# Patient Record
Sex: FEMALE | Race: WHITE | Employment: FULL TIME | URBAN - METROPOLITAN AREA
[De-identification: names, ages, dates, MRNs, and addresses within clinical notes are randomized per-mention and may not be internally consistent; named-entity substitution may affect disease eponyms.]

---

## 2017-01-17 RX ORDER — LANSOPRAZOLE 15 MG/1
CAPSULE, DELAYED RELEASE ORAL
Qty: 90 CAPSULE | Refills: 0 | Status: SHIPPED | OUTPATIENT
Start: 2017-01-17 | End: 2017-07-05

## 2017-07-05 ENCOUNTER — HOSPITAL ENCOUNTER (OUTPATIENT)
Dept: OTHER | Age: 62
Discharge: HOME | End: 2017-07-05
Attending: INTERNAL MEDICINE | Admitting: INTERNAL MEDICINE

## 2017-07-05 ENCOUNTER — OFFICE VISIT (OUTPATIENT)
Dept: INTERNAL MEDICINE CLINIC | Age: 62
End: 2017-07-05

## 2017-07-05 VITALS
HEART RATE: 70 BPM | WEIGHT: 171 LBS | RESPIRATION RATE: 18 BRPM | HEIGHT: 65 IN | DIASTOLIC BLOOD PRESSURE: 75 MMHG | SYSTOLIC BLOOD PRESSURE: 130 MMHG | BODY MASS INDEX: 28.49 KG/M2

## 2017-07-05 DIAGNOSIS — M54.9 MECHANICAL BACK PAIN: Primary | ICD-10-CM

## 2017-07-05 DIAGNOSIS — G47.33 OSA (OBSTRUCTIVE SLEEP APNEA): ICD-10-CM

## 2017-07-05 DIAGNOSIS — Z13.220 LIPID SCREENING: ICD-10-CM

## 2017-07-05 DIAGNOSIS — M54.9 MECHANICAL BACK PAIN: ICD-10-CM

## 2017-07-05 LAB
A/G RATIO: 1.9 (ref 1.1–2.2)
ALBUMIN SERPL-MCNC: 4.2 G/DL (ref 3.4–5)
ALP BLD-CCNC: 61 U/L (ref 40–129)
ALT SERPL-CCNC: 11 U/L (ref 10–40)
ANION GAP SERPL CALCULATED.3IONS-SCNC: 13 MMOL/L (ref 3–16)
AST SERPL-CCNC: 12 U/L (ref 15–37)
BASOPHILS ABSOLUTE: 0 K/UL (ref 0–0.2)
BASOPHILS RELATIVE PERCENT: 1 %
BILIRUB SERPL-MCNC: 0.3 MG/DL (ref 0–1)
BUN BLDV-MCNC: 16 MG/DL (ref 7–20)
CALCIUM SERPL-MCNC: 8.7 MG/DL (ref 8.3–10.6)
CHLORIDE BLD-SCNC: 105 MMOL/L (ref 99–110)
CHOLESTEROL, TOTAL: 173 MG/DL (ref 0–199)
CO2: 22 MMOL/L (ref 21–32)
CREAT SERPL-MCNC: 0.6 MG/DL (ref 0.6–1.1)
EOSINOPHILS ABSOLUTE: 0.3 K/UL (ref 0–0.6)
EOSINOPHILS RELATIVE PERCENT: 5.5 %
GFR AFRICAN AMERICAN: >60
GFR NON-AFRICAN AMERICAN: >60
GLOBULIN: 2.2 G/DL
GLUCOSE BLD-MCNC: 102 MG/DL (ref 70–99)
HCT VFR BLD CALC: 41.5 % (ref 36–48)
HDLC SERPL-MCNC: 58 MG/DL (ref 40–60)
HEMOGLOBIN: 14 G/DL (ref 12–16)
LDL CHOLESTEROL CALCULATED: 104 MG/DL
LYMPHOCYTES ABSOLUTE: 1.4 K/UL (ref 1–5.1)
LYMPHOCYTES RELATIVE PERCENT: 28.5 %
MCH RBC QN AUTO: 31.4 PG (ref 26–34)
MCHC RBC AUTO-ENTMCNC: 33.8 G/DL (ref 31–36)
MCV RBC AUTO: 92.8 FL (ref 80–100)
MONOCYTES ABSOLUTE: 0.4 K/UL (ref 0–1.3)
MONOCYTES RELATIVE PERCENT: 9.1 %
NEUTROPHILS ABSOLUTE: 2.7 K/UL (ref 1.7–7.7)
NEUTROPHILS RELATIVE PERCENT: 55.9 %
PDW BLD-RTO: 17.6 % (ref 12.4–15.4)
PLATELET # BLD: 342 K/UL (ref 135–450)
PMV BLD AUTO: 8.4 FL (ref 5–10.5)
POTASSIUM SERPL-SCNC: 4.7 MMOL/L (ref 3.5–5.1)
RBC # BLD: 4.48 M/UL (ref 4–5.2)
SODIUM BLD-SCNC: 140 MMOL/L (ref 136–145)
TOTAL PROTEIN: 6.4 G/DL (ref 6.4–8.2)
TRIGL SERPL-MCNC: 57 MG/DL (ref 0–150)
VLDLC SERPL CALC-MCNC: 11 MG/DL
WBC # BLD: 4.8 K/UL (ref 4–11)

## 2017-07-05 PROCEDURE — 99213 OFFICE O/P EST LOW 20 MIN: CPT | Performed by: INTERNAL MEDICINE

## 2017-07-05 RX ORDER — CYCLOBENZAPRINE HCL 10 MG
7.5 TABLET ORAL 3 TIMES DAILY PRN
Qty: 30 TABLET | Refills: 0 | Status: SHIPPED | OUTPATIENT
Start: 2017-07-05 | End: 2017-07-15

## 2017-07-05 ASSESSMENT — ENCOUNTER SYMPTOMS: BACK PAIN: 1

## 2017-07-06 ENCOUNTER — TELEPHONE (OUTPATIENT)
Dept: INTERNAL MEDICINE CLINIC | Age: 62
End: 2017-07-06

## 2017-07-06 DIAGNOSIS — M47.819 ARTHRITIS OF LOW BACK: Primary | ICD-10-CM

## 2017-09-12 ENCOUNTER — OFFICE VISIT (OUTPATIENT)
Dept: INTERNAL MEDICINE CLINIC | Age: 62
End: 2017-09-12

## 2017-09-12 VITALS
WEIGHT: 166 LBS | SYSTOLIC BLOOD PRESSURE: 140 MMHG | HEART RATE: 70 BPM | BODY MASS INDEX: 27.66 KG/M2 | DIASTOLIC BLOOD PRESSURE: 90 MMHG | HEIGHT: 65 IN | RESPIRATION RATE: 18 BRPM

## 2017-09-12 DIAGNOSIS — J01.40 ACUTE PANSINUSITIS, RECURRENCE NOT SPECIFIED: Primary | ICD-10-CM

## 2017-09-12 PROCEDURE — 99213 OFFICE O/P EST LOW 20 MIN: CPT | Performed by: INTERNAL MEDICINE

## 2017-09-12 RX ORDER — CIPROFLOXACIN 500 MG/1
500 TABLET, FILM COATED ORAL 2 TIMES DAILY
Qty: 14 TABLET | Refills: 0 | Status: SHIPPED | OUTPATIENT
Start: 2017-09-12 | End: 2017-09-19 | Stop reason: SDUPTHER

## 2017-09-12 ASSESSMENT — ENCOUNTER SYMPTOMS
SWOLLEN GLANDS: 1
SORE THROAT: 1
NAUSEA: 0
COUGH: 1

## 2017-09-19 RX ORDER — CIPROFLOXACIN 500 MG/1
TABLET, FILM COATED ORAL
Qty: 14 TABLET | Refills: 0 | Status: SHIPPED | OUTPATIENT
Start: 2017-09-19 | End: 2019-03-18 | Stop reason: ALTCHOICE

## 2017-09-21 ENCOUNTER — OFFICE VISIT (OUTPATIENT)
Dept: ORTHOPEDIC SURGERY | Age: 62
End: 2017-09-21

## 2017-09-21 VITALS
SYSTOLIC BLOOD PRESSURE: 125 MMHG | HEIGHT: 66 IN | BODY MASS INDEX: 26.68 KG/M2 | HEART RATE: 71 BPM | DIASTOLIC BLOOD PRESSURE: 81 MMHG | WEIGHT: 166 LBS

## 2017-09-21 DIAGNOSIS — M19.042 OSTEOARTHRITIS OF FINGER OF LEFT HAND: ICD-10-CM

## 2017-09-21 DIAGNOSIS — R20.0 NUMBNESS AND TINGLING IN LEFT HAND: ICD-10-CM

## 2017-09-21 DIAGNOSIS — R20.2 NUMBNESS AND TINGLING IN LEFT HAND: ICD-10-CM

## 2017-09-21 DIAGNOSIS — M79.642 LEFT HAND PAIN: Primary | ICD-10-CM

## 2017-09-21 DIAGNOSIS — M65.342 TRIGGER RING FINGER OF LEFT HAND: ICD-10-CM

## 2017-09-21 PROCEDURE — 20550 NJX 1 TENDON SHEATH/LIGAMENT: CPT | Performed by: ORTHOPAEDIC SURGERY

## 2017-09-21 PROCEDURE — L3908 WHO COCK-UP NONMOLDE PRE OTS: HCPCS | Performed by: ORTHOPAEDIC SURGERY

## 2017-09-21 PROCEDURE — 73130 X-RAY EXAM OF HAND: CPT | Performed by: ORTHOPAEDIC SURGERY

## 2017-09-21 PROCEDURE — 99203 OFFICE O/P NEW LOW 30 MIN: CPT | Performed by: ORTHOPAEDIC SURGERY

## 2019-03-18 ENCOUNTER — OFFICE VISIT (OUTPATIENT)
Dept: INTERNAL MEDICINE CLINIC | Age: 64
End: 2019-03-18

## 2019-03-18 VITALS
RESPIRATION RATE: 18 BRPM | DIASTOLIC BLOOD PRESSURE: 85 MMHG | HEIGHT: 64 IN | WEIGHT: 185 LBS | HEART RATE: 70 BPM | BODY MASS INDEX: 31.58 KG/M2 | SYSTOLIC BLOOD PRESSURE: 140 MMHG

## 2019-03-18 DIAGNOSIS — Z00.00 ANNUAL PHYSICAL EXAM: Primary | ICD-10-CM

## 2019-03-18 PROBLEM — M65.342 TRIGGER RING FINGER OF LEFT HAND: Status: RESOLVED | Noted: 2017-09-21 | Resolved: 2019-03-18

## 2019-03-18 PROBLEM — R20.0 NUMBNESS AND TINGLING IN LEFT HAND: Status: RESOLVED | Noted: 2017-09-21 | Resolved: 2019-03-18

## 2019-03-18 PROBLEM — R20.2 NUMBNESS AND TINGLING IN LEFT HAND: Status: RESOLVED | Noted: 2017-09-21 | Resolved: 2019-03-18

## 2019-03-18 PROCEDURE — 99396 PREV VISIT EST AGE 40-64: CPT | Performed by: INTERNAL MEDICINE

## 2019-03-18 ASSESSMENT — PATIENT HEALTH QUESTIONNAIRE - PHQ9
SUM OF ALL RESPONSES TO PHQ QUESTIONS 1-9: 0
SUM OF ALL RESPONSES TO PHQ9 QUESTIONS 1 & 2: 0
SUM OF ALL RESPONSES TO PHQ QUESTIONS 1-9: 0
2. FEELING DOWN, DEPRESSED OR HOPELESS: 0
1. LITTLE INTEREST OR PLEASURE IN DOING THINGS: 0

## 2019-03-18 ASSESSMENT — ENCOUNTER SYMPTOMS
TROUBLE SWALLOWING: 0
CONSTIPATION: 0
CHEST TIGHTNESS: 0
SHORTNESS OF BREATH: 0
ABDOMINAL PAIN: 0
COLOR CHANGE: 0
NAUSEA: 0
PHOTOPHOBIA: 0

## 2019-06-05 DIAGNOSIS — Z00.00 ANNUAL PHYSICAL EXAM: ICD-10-CM

## 2019-06-05 LAB
A/G RATIO: 1.4 (ref 1.1–2.2)
ALBUMIN SERPL-MCNC: 4 G/DL (ref 3.4–5)
ALP BLD-CCNC: 59 U/L (ref 40–129)
ALT SERPL-CCNC: 13 U/L (ref 10–40)
ANION GAP SERPL CALCULATED.3IONS-SCNC: 13 MMOL/L (ref 3–16)
AST SERPL-CCNC: 11 U/L (ref 15–37)
BASOPHILS ABSOLUTE: 0 K/UL (ref 0–0.2)
BASOPHILS RELATIVE PERCENT: 1 %
BILIRUB SERPL-MCNC: 0.3 MG/DL (ref 0–1)
BUN BLDV-MCNC: 18 MG/DL (ref 7–20)
CALCIUM SERPL-MCNC: 9.1 MG/DL (ref 8.3–10.6)
CHLORIDE BLD-SCNC: 104 MMOL/L (ref 99–110)
CHOLESTEROL, FASTING: 192 MG/DL (ref 0–199)
CO2: 23 MMOL/L (ref 21–32)
CREAT SERPL-MCNC: 0.8 MG/DL (ref 0.6–1.2)
EOSINOPHILS ABSOLUTE: 0.2 K/UL (ref 0–0.6)
EOSINOPHILS RELATIVE PERCENT: 3.6 %
GFR AFRICAN AMERICAN: >60
GFR NON-AFRICAN AMERICAN: >60
GLOBULIN: 2.9 G/DL
GLUCOSE BLD-MCNC: 110 MG/DL (ref 70–99)
HCT VFR BLD CALC: 45 % (ref 36–48)
HDLC SERPL-MCNC: 53 MG/DL (ref 40–60)
HEMOGLOBIN: 15.2 G/DL (ref 12–16)
HEPATITIS C ANTIBODY INTERPRETATION: NORMAL
LDL CHOLESTEROL CALCULATED: 127 MG/DL
LYMPHOCYTES ABSOLUTE: 1.2 K/UL (ref 1–5.1)
LYMPHOCYTES RELATIVE PERCENT: 27.1 %
MCH RBC QN AUTO: 31.9 PG (ref 26–34)
MCHC RBC AUTO-ENTMCNC: 33.7 G/DL (ref 31–36)
MCV RBC AUTO: 94.6 FL (ref 80–100)
MONOCYTES ABSOLUTE: 0.5 K/UL (ref 0–1.3)
MONOCYTES RELATIVE PERCENT: 11.1 %
NEUTROPHILS ABSOLUTE: 2.6 K/UL (ref 1.7–7.7)
NEUTROPHILS RELATIVE PERCENT: 57.2 %
PDW BLD-RTO: 14.8 % (ref 12.4–15.4)
PLATELET # BLD: 341 K/UL (ref 135–450)
PMV BLD AUTO: 7.9 FL (ref 5–10.5)
POTASSIUM SERPL-SCNC: 5 MMOL/L (ref 3.5–5.1)
RBC # BLD: 4.76 M/UL (ref 4–5.2)
SODIUM BLD-SCNC: 140 MMOL/L (ref 136–145)
TOTAL PROTEIN: 6.9 G/DL (ref 6.4–8.2)
TRIGLYCERIDE, FASTING: 59 MG/DL (ref 0–150)
TSH REFLEX: 3.02 UIU/ML (ref 0.27–4.2)
URIC ACID, SERUM: 5.5 MG/DL (ref 2.6–6)
VLDLC SERPL CALC-MCNC: 12 MG/DL
WBC # BLD: 4.5 K/UL (ref 4–11)

## 2019-06-17 ENCOUNTER — OFFICE VISIT (OUTPATIENT)
Dept: INTERNAL MEDICINE CLINIC | Age: 64
End: 2019-06-17

## 2019-06-17 ENCOUNTER — HOSPITAL ENCOUNTER (OUTPATIENT)
Age: 64
Discharge: HOME | End: 2019-06-17
Payer: COMMERCIAL

## 2019-06-17 VITALS
HEART RATE: 70 BPM | DIASTOLIC BLOOD PRESSURE: 79 MMHG | BODY MASS INDEX: 31.76 KG/M2 | HEIGHT: 64 IN | WEIGHT: 186 LBS | SYSTOLIC BLOOD PRESSURE: 135 MMHG | RESPIRATION RATE: 18 BRPM

## 2019-06-17 DIAGNOSIS — Z01.818 PRE-OP EVALUATION: Primary | ICD-10-CM

## 2019-06-17 DIAGNOSIS — M16.10 HIP ARTHRITIS: ICD-10-CM

## 2019-06-17 LAB
BILIRUBIN, POC: NORMAL
BLOOD URINE, POC: NORMAL
CLARITY, POC: NORMAL
COLOR, POC: NORMAL
GLUCOSE URINE, POC: NORMAL
KETONES, POC: NORMAL
LEUKOCYTE EST, POC: NORMAL
NITRITE, POC: NORMAL
PH, POC: NORMAL
PROTEIN, POC: NORMAL
SPECIFIC GRAVITY, POC: NORMAL
UROBILINOGEN, POC: NORMAL

## 2019-06-17 PROCEDURE — 99241 PR OFFICE CONSULTATION NEW/ESTAB PATIENT 15 MIN: CPT | Performed by: INTERNAL MEDICINE

## 2019-06-17 PROCEDURE — 87641 MR-STAPH DNA AMP PROBE: CPT

## 2019-06-17 PROCEDURE — 93000 ELECTROCARDIOGRAM COMPLETE: CPT | Performed by: INTERNAL MEDICINE

## 2019-06-17 PROCEDURE — 81002 URINALYSIS NONAUTO W/O SCOPE: CPT | Performed by: INTERNAL MEDICINE

## 2019-06-17 RX ORDER — CETIRIZINE HYDROCHLORIDE 10 MG/1
10 TABLET, CHEWABLE ORAL DAILY
COMMUNITY
End: 2019-11-05 | Stop reason: ALTCHOICE

## 2019-06-17 RX ORDER — SPIRONOLACTONE 50 MG/1
100 TABLET, FILM COATED ORAL DAILY
Refills: 1 | COMMUNITY
Start: 2019-05-28

## 2019-06-17 RX ORDER — NAPROXEN SODIUM 220 MG
660 TABLET ORAL 2 TIMES DAILY
COMMUNITY
End: 2019-06-17

## 2019-06-17 NOTE — PROGRESS NOTES
Subjective:      Saritha Siegel is a 66 y.o. female who presents to the office today for a preoperative consultation at the request of surgeon    Dr. Cuong Ramírez  who plans on performing left hip arthoplasty  . Planned anesthesia is Regional and [facility].       No recent illness or cough or sob  No prior allergies to anesthesia  No cardiac or pulm issues    Takes OTC supplements and takes Aldactone for pedal edema      Past Medical History:   Diagnosis Date    Allergic rhinitis     KIERSTEN (obstructive sleep apnea)     PONV (postoperative nausea and vomiting)      Patient Active Problem List    Diagnosis Date Noted    Osteoarthritis of finger of left hand 09/21/2017    Arthritis 01/18/2016    Pedal edema 01/18/2016    KIERSTEN (obstructive sleep apnea) 01/18/2016     Past Surgical History:   Procedure Laterality Date    BREAST RECONSTRUCTION      benign tumors    COLONOSCOPY  10/09/2015    HERNIA REPAIR      KNEE SURGERY      NOSE SURGERY      TONSILLECTOMY       No family history on file. Social History     Socioeconomic History    Marital status: Single     Spouse name: None    Number of children: None    Years of education: None    Highest education level: None   Occupational History    None   Social Needs    Financial resource strain: None    Food insecurity:     Worry: None     Inability: None    Transportation needs:     Medical: None     Non-medical: None   Tobacco Use    Smoking status: Never Smoker    Smokeless tobacco: Never Used   Substance and Sexual Activity    Alcohol use:  Yes     Alcohol/week: 4.2 oz     Types: 7 Cans of beer per week    Drug use: None    Sexual activity: None   Lifestyle    Physical activity:     Days per week: None     Minutes per session: None    Stress: None   Relationships    Social connections:     Talks on phone: None     Gets together: None     Attends Confucianist service: None     Active member of club or organization: None     Attends meetings of clubs or organizations: None     Relationship status: None    Intimate partner violence:     Fear of current or ex partner: None     Emotionally abused: None     Physically abused: None     Forced sexual activity: None   Other Topics Concern    None   Social History Narrative    None     Current Outpatient Medications   Medication Sig Dispense Refill    Cholecalciferol 2000 units TABS Take 6,000 Units by mouth daily      cetirizine (ZYRTEC) 10 MG chewable tablet Take 10 mg by mouth daily      spironolactone (ALDACTONE) 50 MG tablet Take 50 mg by mouth daily  1    fluticasone (FLONASE) 50 MCG/ACT nasal spray 2 sprays by Nasal route daily 1 Bottle 5    cetirizine (ZYRTEC) 5 MG tablet Take 5 mg by mouth daily      vitamin B-12 (CYANOCOBALAMIN) 100 MCG tablet Take 50 mcg by mouth daily      spironolactone (ALDACTONE) 25 MG tablet Take 25 mg by mouth 2 times daily        No current facility-administered medications for this visit. Allergies   Allergen Reactions    Other Anaphylaxis     CLAMS    Adhesive Tape     Iodine     Sulfa Antibiotics     Sulfasalazine        Not in a hospital admission. Review of Systems  Pertinent items are noted in HPI. Planned anesthesia: general . Regional   Known anesthesia problems:  none  Bleeding risk:  Low   Personal or FH of DVT/PE:  No   Patient objection to receiving blood products: no       /79   Pulse 70   Resp 18   Ht 5' 4\" (1.626 m)   Wt 186 lb (84.4 kg)   LMP 01/10/2012   BMI 31.93 kg/m²   No intake or output data in the 24 hours ending 06/17/19 1607     Objective:         General: middle aged female  Awake, alert and oriented.  Appears to be not in any distress  Mucous Membranes:  Pink , anicteric  Neck: No JVD, no carotid bruit, no thyromegaly  Chest:  Clear to auscultation bilaterally, no added sounds  Cardiovascular:  RRR S1S2 heard, no murmurs or gallops  Abdomen:  Soft, undistended, non tender, no organomegaly, BS present  Extremities: No edema

## 2019-06-18 ENCOUNTER — TELEPHONE (OUTPATIENT)
Dept: INTERNAL MEDICINE CLINIC | Age: 64
End: 2019-06-18

## 2019-06-18 LAB — MRSA SCREEN RT-PCR: NORMAL

## 2019-06-19 ENCOUNTER — TELEPHONE (OUTPATIENT)
Dept: INTERNAL MEDICINE CLINIC | Age: 64
End: 2019-06-19

## 2019-06-19 NOTE — TELEPHONE ENCOUNTER
----- Message from Humble Coates MD sent at 6/19/2019  2:54 PM EDT -----    Yes    ----- Message -----  From: Madeline Teague  Sent: 6/19/2019   1:11 PM  To: Humble Coates MD    Culture did not get collected on pt. Does pt need to come back in for this? UA had shown trace leuk. Please advise.

## 2019-06-20 ENCOUNTER — NURSE ONLY (OUTPATIENT)
Dept: INTERNAL MEDICINE CLINIC | Age: 64
End: 2019-06-20

## 2019-06-20 DIAGNOSIS — Z01.818 PREOP EXAMINATION: Primary | ICD-10-CM

## 2019-06-22 LAB — URINE CULTURE, ROUTINE: NORMAL

## 2019-08-15 ENCOUNTER — TELEPHONE (OUTPATIENT)
Dept: INTERNAL MEDICINE CLINIC | Age: 64
End: 2019-08-15

## 2019-08-15 RX ORDER — AMOXICILLIN 500 MG/1
500 CAPSULE ORAL 3 TIMES DAILY
Qty: 15 CAPSULE | Refills: 0 | Status: SHIPPED | OUTPATIENT
Start: 2019-08-15 | End: 2019-08-20

## 2019-08-15 NOTE — TELEPHONE ENCOUNTER
----- Message from Kali Mayers MD sent at 8/15/2019  7:43 AM EDT -----  Contact: pt [phone]  amox 500 tid x 5 days    ----- Message -----  From: Corinne Reich  Sent: 8/14/2019   2:36 PM EDT  To: Kali Mayers MD    Pt called and said that she is getting her teeth cleaned on 9/10/19 and she is requesting for you to prescribe her a days worth of antibiotics for her teeth cleaning. [facility]acy- [facility] app[facility]. 6-17-19.

## 2019-09-10 ENCOUNTER — TELEPHONE (OUTPATIENT)
Dept: INTERNAL MEDICINE CLINIC | Age: 64
End: 2019-09-10

## 2019-09-10 RX ORDER — AMOXICILLIN 500 MG/1
500 CAPSULE ORAL 3 TIMES DAILY
Qty: 15 CAPSULE | Refills: 0 | Status: SHIPPED | OUTPATIENT
Start: 2019-09-10 | End: 2019-09-10

## 2019-09-10 RX ORDER — AMOXICILLIN 500 MG/1
500 CAPSULE ORAL 3 TIMES DAILY
Qty: 15 CAPSULE | Refills: 0 | Status: SHIPPED | OUTPATIENT
Start: 2019-09-10 | End: 2019-09-15

## 2019-11-05 ENCOUNTER — OFFICE VISIT (OUTPATIENT)
Dept: INTERNAL MEDICINE CLINIC | Age: 64
End: 2019-11-05

## 2019-11-05 ENCOUNTER — HOSPITAL ENCOUNTER (OUTPATIENT)
Age: 64
Discharge: HOME | End: 2019-11-05
Payer: COMMERCIAL

## 2019-11-05 VITALS
BODY MASS INDEX: 30.99 KG/M2 | RESPIRATION RATE: 18 BRPM | HEIGHT: 65 IN | HEART RATE: 70 BPM | SYSTOLIC BLOOD PRESSURE: 125 MMHG | WEIGHT: 186 LBS | DIASTOLIC BLOOD PRESSURE: 75 MMHG

## 2019-11-05 DIAGNOSIS — Z01.818 PRE-OP EVALUATION: ICD-10-CM

## 2019-11-05 DIAGNOSIS — M19.90 ARTHRITIS: ICD-10-CM

## 2019-11-05 DIAGNOSIS — Z23 NEED FOR INFLUENZA VACCINATION: ICD-10-CM

## 2019-11-05 DIAGNOSIS — M16.10 HIP ARTHRITIS: ICD-10-CM

## 2019-11-05 DIAGNOSIS — R60.0 PEDAL EDEMA: ICD-10-CM

## 2019-11-05 DIAGNOSIS — Z01.818 PRE-OP EVALUATION: Primary | ICD-10-CM

## 2019-11-05 PROBLEM — M16.12 ARTHRITIS OF LEFT HIP: Status: ACTIVE | Noted: 2019-06-24

## 2019-11-05 LAB
INR BLD: 1.07 (ref 0.86–1.14)
PROTHROMBIN TIME: 12.2 SEC (ref 9.8–13)

## 2019-11-05 PROCEDURE — 90682 RIV4 VACC RECOMBINANT DNA IM: CPT | Performed by: INTERNAL MEDICINE

## 2019-11-05 PROCEDURE — 90732 PPSV23 VACC 2 YRS+ SUBQ/IM: CPT | Performed by: INTERNAL MEDICINE

## 2019-11-05 PROCEDURE — 83036 HEMOGLOBIN GLYCOSYLATED A1C: CPT

## 2019-11-05 PROCEDURE — 90472 IMMUNIZATION ADMIN EACH ADD: CPT | Performed by: INTERNAL MEDICINE

## 2019-11-05 PROCEDURE — 85610 PROTHROMBIN TIME: CPT

## 2019-11-05 PROCEDURE — 99241 PR OFFICE CONSULTATION NEW/ESTAB PATIENT 15 MIN: CPT | Performed by: INTERNAL MEDICINE

## 2019-11-05 PROCEDURE — 36415 COLL VENOUS BLD VENIPUNCTURE: CPT

## 2019-11-05 PROCEDURE — 90471 IMMUNIZATION ADMIN: CPT | Performed by: INTERNAL MEDICINE

## 2019-11-05 PROCEDURE — 87081 CULTURE SCREEN ONLY: CPT

## 2019-11-06 LAB
ESTIMATED AVERAGE GLUCOSE: 125.5 MG/DL
HBA1C MFR BLD: 6 %

## 2019-11-07 ENCOUNTER — TELEPHONE (OUTPATIENT)
Dept: INTERNAL MEDICINE CLINIC | Age: 64
End: 2019-11-07

## 2019-11-08 ENCOUNTER — NURSE ONLY (OUTPATIENT)
Dept: INTERNAL MEDICINE CLINIC | Age: 64
End: 2019-11-08

## 2019-11-08 DIAGNOSIS — Z00.00 ROUTINE GENERAL MEDICAL EXAMINATION AT A HEALTH CARE FACILITY: Primary | ICD-10-CM

## 2019-11-08 LAB
BILIRUBIN, POC: NORMAL
BLOOD URINE, POC: NORMAL
CLARITY, POC: NORMAL
COLOR, POC: NORMAL
GLUCOSE URINE, POC: NORMAL
KETONES, POC: NORMAL
LEUKOCYTE EST, POC: NORMAL
MRSA CULTURE ONLY: NORMAL
NITRITE, POC: NORMAL
PH, POC: NORMAL
PROTEIN, POC: NORMAL
SPECIFIC GRAVITY, POC: NORMAL
UROBILINOGEN, POC: NORMAL

## 2019-11-08 PROCEDURE — 81002 URINALYSIS NONAUTO W/O SCOPE: CPT | Performed by: INTERNAL MEDICINE

## 2019-11-19 ENCOUNTER — TELEPHONE (OUTPATIENT)
Dept: INTERNAL MEDICINE CLINIC | Age: 64
End: 2019-11-19

## 2020-01-15 ENCOUNTER — TELEPHONE (OUTPATIENT)
Dept: INTERNAL MEDICINE CLINIC | Age: 65
End: 2020-01-15

## 2020-01-15 NOTE — TELEPHONE ENCOUNTER
----- Message from Juanito Phoenix sent at 1/15/2020 10:33 AM EST -----  Contact: [phone]  Pt was just calling to make sure we had the results from Dr. Ekaterina Haq for her mammogram that was done on 1/3 also her PAP smear from Dr. Mayte Telles.      [phone]

## 2020-02-28 RX ORDER — AMOXICILLIN 500 MG/1
500 CAPSULE ORAL 3 TIMES DAILY
Qty: 15 CAPSULE | Refills: 0 | Status: SHIPPED | OUTPATIENT
Start: 2020-02-28 | End: 2020-03-04

## 2020-10-01 ENCOUNTER — TELEPHONE (OUTPATIENT)
Dept: INTERNAL MEDICINE CLINIC | Age: 65
End: 2020-10-01

## 2020-10-01 NOTE — TELEPHONE ENCOUNTER
----- Message from Mickie Connelly MD sent at 10/1/2020  9:57 AM EDT -----  Contact: pt- [phone]  After the COVID test call in Amoxil 500 mg po tid # 30  ----- Message -----  From: Marsha Ramosmarthadaniela Casey  Sent: 10/1/2020   9:41 AM EDT  To: MD Dr Roger Knowles pt- she has been sick for about 3 days now- sinus drainage and cough from that-she has sinus pain in her cheeks and the roof of her mouth and teeth hurt also- no fever and no other symptoms- she thinks it is a sinus infection -she has been using dayquil but not helping - wanted to know what you recommend- [facility] at [phone]-[phone] appt- 11-5-19-lr

## 2020-10-02 ENCOUNTER — OFFICE VISIT (OUTPATIENT)
Dept: PRIMARY CARE CLINIC | Age: 65
End: 2020-10-02
Payer: COMMERCIAL

## 2020-10-02 PROCEDURE — 99211 OFF/OP EST MAY X REQ PHY/QHP: CPT | Performed by: NURSE PRACTITIONER

## 2020-10-02 NOTE — PROGRESS NOTES
Wen Gan received a viral test for COVID-19. They were educated on isolation and quarantine as appropriate. For any symptoms, they were directed to seek care from their PCP, given contact information to establish with a doctor, directed to an urgent care or the emergency room.

## 2020-10-02 NOTE — PATIENT INSTRUCTIONS

## 2020-10-03 LAB — SARS-COV-2, NAA: NOT DETECTED

## 2020-10-13 ENCOUNTER — TELEPHONE (OUTPATIENT)
Dept: INTERNAL MEDICINE CLINIC | Age: 65
End: 2020-10-13

## 2020-10-13 ENCOUNTER — HOSPITAL ENCOUNTER (EMERGENCY)
Age: 65
Discharge: HOME | End: 2020-10-13
Payer: COMMERCIAL

## 2020-10-13 VITALS
WEIGHT: 185 LBS | BODY MASS INDEX: 30.82 KG/M2 | TEMPERATURE: 99.1 F | SYSTOLIC BLOOD PRESSURE: 130 MMHG | OXYGEN SATURATION: 100 % | HEART RATE: 78 BPM | RESPIRATION RATE: 17 BRPM | DIASTOLIC BLOOD PRESSURE: 61 MMHG | HEIGHT: 65 IN

## 2020-10-13 LAB
A/G RATIO: 1.6 (ref 1.1–2.2)
ALBUMIN SERPL-MCNC: 4.4 G/DL (ref 3.4–5)
ALP BLD-CCNC: 81 U/L (ref 40–129)
ALT SERPL-CCNC: 12 U/L (ref 10–40)
ANION GAP SERPL CALCULATED.3IONS-SCNC: 11 MMOL/L (ref 3–16)
AST SERPL-CCNC: 16 U/L (ref 15–37)
BASOPHILS ABSOLUTE: 0 K/UL (ref 0–0.2)
BASOPHILS RELATIVE PERCENT: 0.2 %
BILIRUB SERPL-MCNC: 0.8 MG/DL (ref 0–1)
BUN BLDV-MCNC: 9 MG/DL (ref 7–20)
CALCIUM SERPL-MCNC: 9.4 MG/DL (ref 8.3–10.6)
CHLORIDE BLD-SCNC: 99 MMOL/L (ref 99–110)
CO2: 25 MMOL/L (ref 21–32)
CREAT SERPL-MCNC: 0.7 MG/DL (ref 0.6–1.2)
EOSINOPHILS ABSOLUTE: 0.4 K/UL (ref 0–0.6)
EOSINOPHILS RELATIVE PERCENT: 3.7 %
GFR AFRICAN AMERICAN: >60
GFR NON-AFRICAN AMERICAN: >60
GLOBULIN: 2.7 G/DL
GLUCOSE BLD-MCNC: 102 MG/DL (ref 70–99)
HCT VFR BLD CALC: 51.2 % (ref 36–48)
HEMOGLOBIN: 17 G/DL (ref 12–16)
LYMPHOCYTES ABSOLUTE: 0.9 K/UL (ref 1–5.1)
LYMPHOCYTES RELATIVE PERCENT: 7.9 %
MCH RBC QN AUTO: 31.7 PG (ref 26–34)
MCHC RBC AUTO-ENTMCNC: 33.3 G/DL (ref 31–36)
MCV RBC AUTO: 95.3 FL (ref 80–100)
MONOCYTES ABSOLUTE: 0.5 K/UL (ref 0–1.3)
MONOCYTES RELATIVE PERCENT: 4.5 %
NEUTROPHILS ABSOLUTE: 9.2 K/UL (ref 1.7–7.7)
NEUTROPHILS RELATIVE PERCENT: 83.7 %
PDW BLD-RTO: 15.5 % (ref 12.4–15.4)
PLATELET # BLD: 347 K/UL (ref 135–450)
PMV BLD AUTO: 7.8 FL (ref 5–10.5)
POTASSIUM REFLEX MAGNESIUM: 4.4 MMOL/L (ref 3.5–5.1)
RBC # BLD: 5.37 M/UL (ref 4–5.2)
SODIUM BLD-SCNC: 135 MMOL/L (ref 136–145)
TOTAL PROTEIN: 7.1 G/DL (ref 6.4–8.2)
WBC # BLD: 11 K/UL (ref 4–11)

## 2020-10-13 PROCEDURE — 96374 THER/PROPH/DIAG INJ IV PUSH: CPT

## 2020-10-13 PROCEDURE — 2500000003 HC RX 250 WO HCPCS: Performed by: PHYSICIAN ASSISTANT

## 2020-10-13 PROCEDURE — 85025 COMPLETE CBC W/AUTO DIFF WBC: CPT

## 2020-10-13 PROCEDURE — 96376 TX/PRO/DX INJ SAME DRUG ADON: CPT

## 2020-10-13 PROCEDURE — 99283 EMERGENCY DEPT VISIT LOW MDM: CPT

## 2020-10-13 PROCEDURE — 80053 COMPREHEN METABOLIC PANEL: CPT

## 2020-10-13 PROCEDURE — 2580000003 HC RX 258: Performed by: PHYSICIAN ASSISTANT

## 2020-10-13 PROCEDURE — 96375 TX/PRO/DX INJ NEW DRUG ADDON: CPT

## 2020-10-13 PROCEDURE — 6360000002 HC RX W HCPCS: Performed by: PHYSICIAN ASSISTANT

## 2020-10-13 RX ORDER — DIPHENHYDRAMINE HYDROCHLORIDE 50 MG/ML
25 INJECTION INTRAMUSCULAR; INTRAVENOUS ONCE
Status: COMPLETED | OUTPATIENT
Start: 2020-10-13 | End: 2020-10-13

## 2020-10-13 RX ORDER — 0.9 % SODIUM CHLORIDE 0.9 %
1000 INTRAVENOUS SOLUTION INTRAVENOUS ONCE
Status: COMPLETED | OUTPATIENT
Start: 2020-10-13 | End: 2020-10-13

## 2020-10-13 RX ORDER — GABAPENTIN 100 MG/1
100 CAPSULE ORAL 3 TIMES DAILY
COMMUNITY
End: 2021-06-30 | Stop reason: ALTCHOICE

## 2020-10-13 RX ORDER — MAGNESIUM 30 MG
400 TABLET ORAL NIGHTLY
COMMUNITY

## 2020-10-13 RX ORDER — PREDNISONE 10 MG/1
60 TABLET ORAL DAILY
Qty: 30 TABLET | Refills: 0 | Status: SHIPPED | OUTPATIENT
Start: 2020-10-13 | End: 2020-10-18

## 2020-10-13 RX ORDER — FAMOTIDINE 20 MG/1
20 TABLET, FILM COATED ORAL 2 TIMES DAILY
Qty: 60 TABLET | Refills: 0 | Status: SHIPPED | OUTPATIENT
Start: 2020-10-13 | End: 2022-07-06 | Stop reason: ALTCHOICE

## 2020-10-13 RX ORDER — DIPHENHYDRAMINE HYDROCHLORIDE 50 MG/ML
12.5 INJECTION INTRAMUSCULAR; INTRAVENOUS ONCE
Status: COMPLETED | OUTPATIENT
Start: 2020-10-13 | End: 2020-10-13

## 2020-10-13 RX ORDER — DEXAMETHASONE SODIUM PHOSPHATE 10 MG/ML
10 INJECTION, SOLUTION INTRAMUSCULAR; INTRAVENOUS ONCE
Status: COMPLETED | OUTPATIENT
Start: 2020-10-13 | End: 2020-10-13

## 2020-10-13 RX ADMIN — DIPHENHYDRAMINE HYDROCHLORIDE 12.5 MG: 50 INJECTION, SOLUTION INTRAMUSCULAR; INTRAVENOUS at 12:23

## 2020-10-13 RX ADMIN — FAMOTIDINE 20 MG: 10 INJECTION INTRAVENOUS at 10:48

## 2020-10-13 RX ADMIN — DEXAMETHASONE SODIUM PHOSPHATE 10 MG: 10 INJECTION INTRAMUSCULAR; INTRAVENOUS at 10:48

## 2020-10-13 RX ADMIN — SODIUM CHLORIDE 1000 ML: 9 INJECTION, SOLUTION INTRAVENOUS at 10:47

## 2020-10-13 RX ADMIN — DIPHENHYDRAMINE HYDROCHLORIDE 25 MG: 50 INJECTION, SOLUTION INTRAMUSCULAR; INTRAVENOUS at 10:47

## 2020-10-13 ASSESSMENT — ENCOUNTER SYMPTOMS
RESPIRATORY NEGATIVE: 1
GASTROINTESTINAL NEGATIVE: 1

## 2020-10-13 NOTE — TELEPHONE ENCOUNTER
----- Message from Meagan Huggins MD sent at 10/13/2020  8:58 AM EDT -----  Stop clinda   Update allergy list  Add augmentin 875 mg bid x 5 days  ----- Message -----  From: Lanny Chan  Sent: 10/13/2020   8:10 AM EDT  To: Meagan Huggins MD    She is covered in hives and thinks it may be from the clindamycin she was given. She is going to ER to take care of this problem.   Also wanted to let you know she is having a root canal Oct. 26 and will need an antibiotic 2 days before and 2 days after  Grady Sharp

## 2020-10-13 NOTE — ED PROVIDER NOTES
[address] [facility]  EMERGENCY DEPARTMENT ENCOUNTER        Pt Name: Tonya Eli  MRN: [id number]  [place] 1955  Date of evaluation: 10/13/2020  Provider: Ольга Phoenix PA-C  PCP: Mabel Felty, MD    DOMENICO. I have evaluated this patient. My supervising physician was available for consultation. CHIEF COMPLAINT       Chief Complaint   Patient presents with    Rash     Excessive rash over entire body. Took Clindamycin a week ago and developed rash yesterday. Denies SOB or throat itching. Took benadryl with no relief. HISTORY OF PRESENT ILLNESS   (Location, Timing/Onset, Context/Setting, Quality, Duration, Modifying Factors, Severity, Associated Signs and Symptoms)  Note limiting factors. Tonya Eli is a 60 y.o. female with a past medical history of sleep apnea brought in today for evaluation of a \"rash\" covering her entire body. She states she started taking clindamycin 1 week ago for a sinus infection. She developed the hive-like rash 2 days ago. She admits to pruritus. She took Benadryl yesterday with no relief of symptoms. Onset of the rash started 2 days ago. Duration of symptoms have been persistent since onset. Context includes after taking clindamycin for a sinus infection 1 week prior. No aggravating or alleviating complaints. Denies any chest pain shortness of breath difficulty breathing or trouble swallowing. She denies any known allergy to clindamycin. She does have an allergy to sulfa drugs and does get hives with sulfa drugs. Denies any fevers or chills. She otherwise denies any other complaints. Nothing seems to make the rash better or worse at this time. Nursing Notes were all reviewed and agreed with or any disagreements were addressed in the HPI. REVIEW OF SYSTEMS    (2-9 systems for level 4, 10 or more for level 5)     Review of Systems   Constitutional: Negative. Respiratory: Negative. Cardiovascular: Negative. Gastrointestinal: Negative. Genitourinary: Negative. Musculoskeletal: Negative. Skin: Positive for rash. Neurological: Negative. Positives and Pertinent negatives as per HPI. Except as noted above in the ROS, all other systems were reviewed and negative. PAST MEDICAL HISTORY     Past Medical History:   Diagnosis Date    Allergic rhinitis     KIERSTEN (obstructive sleep apnea)     PONV (postoperative nausea and vomiting)          SURGICAL HISTORY     Past Surgical History:   Procedure Laterality Date    BREAST RECONSTRUCTION      benign tumors    COLONOSCOPY  10/09/2015    HERNIA REPAIR      JOINT REPLACEMENT      left - 6/2019, right 12/2019    KNEE SURGERY      NOSE SURGERY      TONSILLECTOMY           CURRENTMEDICATIONS       Discharge Medication List as of 10/13/2020  1:19 PM      CONTINUE these medications which have NOT CHANGED    Details   magnesium 30 MG tablet Take 400 mg by mouth nightly Historical Med      gabapentin (NEURONTIN) 100 MG capsule Take 100 mg by mouth 3 times daily. Historical Med      Cholecalciferol 2000 units TABS Take 6,000 Units by mouth dailyHistorical Med      spironolactone (ALDACTONE) 50 MG tablet Take 100 mg by mouth daily , R-1Historical Med      cetirizine (ZYRTEC) 5 MG tablet Take 5 mg by mouth daily      fluticasone (FLONASE) 50 MCG/ACT nasal spray 2 sprays by Nasal route daily, Disp-1 Bottle, R-5      vitamin B-12 (CYANOCOBALAMIN) 100 MCG tablet Take 50 mcg by mouth daily               ALLERGIES     Other; Adhesive tape; Iodine; Penicillins; Sulfa antibiotics; Sulfasalazine; and Clindamycin/lincomycin    FAMILYHISTORY     History reviewed. No pertinent family history. SOCIAL HISTORY       Social History     Tobacco Use    Smoking status: Never Smoker    Smokeless tobacco: Never Used   Substance Use Topics    Alcohol use:  Yes     Alcohol/week: 1.0 standard drinks     Types: 1 Glasses of wine per week     Comment: nightly    Drug use: Not on file       SCREENINGS    Fabian Coma Scale  Eye Opening: Spontaneous  Best Verbal Response: Oriented  Best Motor Response: Obeys commands  Fabian Coma Scale Score: 15        PHYSICAL EXAM    (up to 7 for level 4, 8 or more for level 5)     ED Triage Vitals [10/13/20 0920]   BP Temp Temp Source Pulse Resp SpO2 Height Weight   (!) 150/80 99.1 °F (37.3 °C) Tympanic 102 15 100 % 5' 5\" (1.651 m) 185 lb (83.9 kg)       Physical Exam  Vitals signs and nursing note reviewed. Constitutional:       General: She is awake. She is not in acute distress. Appearance: Normal appearance. She is well-developed and overweight. She is not ill-appearing, toxic-appearing or diaphoretic. HENT:      Head: Normocephalic and atraumatic. Nose: Nose normal.      Mouth/Throat:      Lips: Pink. Mouth: No injury, lacerations, oral lesions or angioedema. Tongue: No lesions. Palate: No lesions. Pharynx: Oropharynx is clear. Uvula midline. No pharyngeal swelling, oropharyngeal exudate, posterior oropharyngeal erythema or uvula swelling. Tonsils: No tonsillar exudate or tonsillar abscesses. 0 on the right. 0 on the left. Comments: No swelling noted to the tongue or the floor of the mouth. Oral mucosa is pink and moist.  No tripod positioning. No drooling. Eyes:      General:         Right eye: No discharge. Left eye: No discharge. Neck:      Musculoskeletal: Normal range of motion and neck supple. Cardiovascular:      Rate and Rhythm: Normal rate and regular rhythm. Pulses:           Radial pulses are 2+ on the right side and 2+ on the left side. Heart sounds: Normal heart sounds. No murmur. No gallop. Pulmonary:      Effort: Pulmonary effort is normal. No respiratory distress. Breath sounds: Normal breath sounds. No wheezing or rales. Chest:      Chest wall: No tenderness. Musculoskeletal: Normal range of motion. General: No deformity.    Skin:     General: Skin is warm and dry. Findings: Rash present. Rash is urticarial.      Comments: See picture below. Rash blanches freely. Neurological:      General: No focal deficit present. Mental Status: She is alert and oriented to person, place, and time. GCS: GCS eye subscore is 4. GCS verbal subscore is 5. GCS motor subscore is 6. Psychiatric:         Behavior: Behavior normal. Behavior is cooperative. DIAGNOSTIC RESULTS   LABS:    Labs Reviewed   CBC WITH AUTO DIFFERENTIAL - Abnormal; Notable for the following components:       Result Value    RBC 5.37 (*)     Hemoglobin 17.0 (*)     Hematocrit 51.2 (*)     RDW 15.5 (*)     Neutrophils Absolute 9.2 (*)     Lymphocytes Absolute 0.9 (*)     All other components within normal limits    Narrative:     Performed at:  [facility] [address],  [place], [facility]   Phone [phone]   COMPREHENSIVE METABOLIC PANEL W/ REFLEX TO MG FOR LOW K - Abnormal; Notable for the following components:    Sodium 135 (*)     Glucose 102 (*)     All other components within normal limits    Narrative:     Performed at:  [facility]) - [facility] [address],  [place], [facility]   Phone [phone]       All other labs were within normal range or not returned as of this dictation. EKG: All EKG's are interpreted by the Emergency Department Physician in the absence of a cardiologist.  Please see their note for interpretation of EKG. RADIOLOGY:   Non-plain film images such as CT, Ultrasound and MRI are read by the radiologist. Plain radiographic images are visualized and preliminarily interpreted by the ED Provider with the below findings:        Interpretation per the Radiologist below, if available at the time of this note:    No orders to display     No results found.         PROCEDURES   Unless otherwise noted below, none     Procedures    CRITICAL CARE TIME N/A    CONSULTS:  None      EMERGENCY DEPARTMENT COURSE and DIFFERENTIAL DIAGNOSIS/MDM:   Vitals:    Vitals:    10/13/20 0920 10/13/20 1101 10/13/20 1354   BP: (!) 150/80 136/70 130/61   Pulse: 102 79 78   Resp: 15 17 17   Temp: 99.1 °F (37.3 °C)     TempSrc: Tympanic     SpO2: 100% 100% 100%   Weight: 185 lb (83.9 kg)     Height: 5' 5\" (1.651 m)         Patient was given the following medications:  Medications   0.9 % sodium chloride bolus (0 mLs Intravenous Stopped 10/13/20 1355)   diphenhydrAMINE (BENADRYL) injection 25 mg (25 mg Intravenous Given 10/13/20 1047)   dexamethasone (PF) (DECADRON) injection 10 mg (10 mg Intravenous Given 10/13/20 1048)   famotidine (PEPCID) injection 20 mg (20 mg Intravenous Given 10/13/20 1048)   diphenhydrAMINE (BENADRYL) injection 12.5 mg (12.5 mg Intravenous Given 10/13/20 1223)           Patient brought in today by private vehicle for complaints of a pruritic rash that started 2 days prior. On exam she is alert oriented afebrile breathing on room air satting at 100%. Nontoxic in appearance no acute respiratory distress. Old labs and records reviewed. Patient seen by myself and my attending was available for consultation. CBC reveals no acute leukocytosis. Hemoglobin is 17. No acute electrolyte abnormalities. Kidney function unremarkable. Patient received fluids, Pepcid Benadryl and Decadron while here in the ER. Patient also received another dose of Benadryl while here in the ER. Patient was observed here in the ER and was hemodynamically stable. Her vitals were stable this entire time. Patient denied any difficulty swallowing or trouble breathing. Plan at this time will be to discharge with Pepcid as well as prednisone. Patient reports she does have Benadryl at home. Patient told to continue with Pepcid, Benadryl and prednisone at home. Patient also encouraged to continue with oatmeal baths at home.   She was told to follow-up with her family physician

## 2020-10-13 NOTE — TELEPHONE ENCOUNTER
----- Message from Earle Rea MD sent at 10/13/2020  3:42 PM EDT -----  [id number] [address] Abbasi  ----- Message -----  From: Dg Peter  Sent: 10/13/2020   3:09 PM EDT  To: Earle Rea MD    Pt will be finished with ER abx eight days before root canal.  Pt wanting to make sure this is ok?  ----- Message -----  From: Earle Rea MD  Sent: 10/13/2020   2:35 PM EDT  To: Dg Peter    Yes  ----- Message -----  From: Dg Peter  Sent: 10/13/2020   1:54 PM EDT  To: Earle Rea MD    So the abx from ER is enough for her root canal also?  ----- Message -----  From: Earle Rea MD  Sent: 10/13/2020   1:02 PM EDT  To: Dg Peter    So no more need for augmentin  ----- Message -----  From: Dg Peter  Sent: 10/13/2020   9:54 AM EDT  To: Earle Rea MD    Allergy to pcn and sulfa drugs. Please advise. Pt is in ER and they are also prescribing abx for her current issue.  ----- Message -----  From: Earle Rea MD  Sent: 10/13/2020   8:58 AM EDT  To: Abhijeet Gonsalez    Stop clinda   Update allergy list  Add augmentin 875 mg bid x 5 days  ----- Message -----  From: Donald Floyd  Sent: 10/13/2020   8:10 AM EDT  To: Earle Rea MD    She is covered in hives and thinks it may be from the clindamycin she was given. She is going to ER to take care of this problem.   Also wanted to let you know she is having a root canal Oct. 26 and will need an antibiotic 2 days before and 2 days after  Jack Babb

## 2020-10-14 ENCOUNTER — TELEPHONE (OUTPATIENT)
Dept: INTERNAL MEDICINE CLINIC | Age: 65
End: 2020-10-14

## 2020-10-14 ENCOUNTER — CARE COORDINATION (OUTPATIENT)
Dept: CARE COORDINATION | Age: 65
End: 2020-10-14

## 2020-10-14 RX ORDER — HYDROXYZINE 50 MG/1
50 TABLET, FILM COATED ORAL 3 TIMES DAILY PRN
Qty: 30 TABLET | Refills: 0 | Status: SHIPPED | OUTPATIENT
Start: 2020-10-14 | End: 2020-10-24

## 2020-10-14 NOTE — TELEPHONE ENCOUNTER
Per Dr Alcira Cardozo since patient was prescribed prednisone, he is prescribing her atarax 50 mg tid #30. Patient informed of this by Ryne Lerma.   She also informed patient to not take benadryl with the atarax

## 2020-10-14 NOTE — TELEPHONE ENCOUNTER
----- Message from Twila Rice sent at 10/14/2020  4:05 PM EDT -----  Medrol dose pack per DR. RUBY  ----- Message -----  From: Twila Dwayne  Sent: 10/14/2020   3:35 PM EDT  To: aHllie Thapa MD    Pt states she will have to use a whole tube each time she applies it because she is covered.   Wants to know if there is something else she should do?  ----- Message -----  From: Hallie Thapa MD  Sent: 10/14/2020   3:31 PM EDT  To: Twila Rice    Have her get OTC Benadryl itch cream  ----- Message -----  From: Dante Hidalgo  Sent: 10/14/2020   2:47 PM EDT  To: MD Fior Simons told her to call to get a anti itch medicine called in, pictures are in her chart she had allergic reaction to medication zachary vidales  she has appointment 10/19 with [facility]

## 2020-10-14 NOTE — CARE COORDINATION
ACM received a voicemail and text message regarding her rash. It has not improved. ACM reached out to office to update them to her status. She was offered a follow up appt for tomorrow that would not work with her schedule. Dr. Luna Garcia ordered atarax 50 mg tid. I have instructed patient on the use and specified that she is not to use the benadryl and atarax at the same time. Patient verbalized instructions back to me.
people if COVID-19 is spreading in your community.  Clean and disinfect frequently touched surfaces.  Avoid all cruise travel and non-essential air travel.  Call your healthcare professional if you have concerns about COVID-19 and your underlying condition or if you are sick. For more information on steps you can take to protect yourself, see CDC's How to [place] for follow-up call in 1-2 days based on severity of symptoms and risk factors.   Message to provider related to ongoing symptoms

## 2020-10-15 ENCOUNTER — CARE COORDINATION (OUTPATIENT)
Dept: CARE COORDINATION | Age: 65
End: 2020-10-15

## 2020-10-15 NOTE — CARE COORDINATION
ACM received return call from patient. Reported her itching is much better with use of the atarax. She has a follow up at PCP office on Monday.

## 2020-10-26 ENCOUNTER — OFFICE VISIT (OUTPATIENT)
Dept: INTERNAL MEDICINE CLINIC | Age: 65
End: 2020-10-26

## 2020-10-26 VITALS
HEIGHT: 65 IN | HEART RATE: 78 BPM | DIASTOLIC BLOOD PRESSURE: 80 MMHG | WEIGHT: 185 LBS | RESPIRATION RATE: 12 BRPM | BODY MASS INDEX: 30.82 KG/M2 | SYSTOLIC BLOOD PRESSURE: 120 MMHG | TEMPERATURE: 97.1 F

## 2020-10-26 PROCEDURE — 1111F DSCHRG MED/CURRENT MED MERGE: CPT | Performed by: PHYSICIAN ASSISTANT

## 2020-10-26 PROCEDURE — 99213 OFFICE O/P EST LOW 20 MIN: CPT | Performed by: PHYSICIAN ASSISTANT

## 2020-10-26 RX ORDER — AMOXICILLIN 500 MG/1
500 CAPSULE ORAL 2 TIMES DAILY
Qty: 14 CAPSULE | Refills: 0 | Status: SHIPPED | OUTPATIENT
Start: 2020-10-26 | End: 2020-11-02

## 2020-10-26 ASSESSMENT — ENCOUNTER SYMPTOMS
SORE THROAT: 1
COUGH: 0
RHINORRHEA: 1
SINUS PAIN: 1
SINUS PRESSURE: 1

## 2020-10-26 NOTE — PROGRESS NOTES
Chief Complaint:   Jonathan Lala is a 63 y.o. female who presents for   Chief Complaint   Patient presents with    Follow-Up from Hospital    Sinusitis       HPI:     Presents for ER follow up. Pt was seen in ED on 10/13 for an allergic reaction. Pt had developed a sinus infection in early October. Sent for COVID test which was negative. She continued to have sinus discomfort and pt went to dentist where she had xrays that noted a sinus infection. Pt was started on Clindamycin. About 2 days later, pt developed a diffuse painful, itchy rash. She went to the ED. She was given Pepcid, Decadron and Benadryl. She was sent home with Benadryl and Prednisone and told to discontinue Clindamycin. She reports in about a weeks time, the rash began to fade and pt felt much improvement. She does report still having sinus pain and tenderness. Pt states she took 7 days of Clindamycin. Review of Systems  Review of Systems   Constitutional: Negative for chills and fever. HENT: Positive for postnasal drip, rhinorrhea, sinus pressure, sinus pain and sore throat. Respiratory: Negative for cough. Cardiovascular: Negative for chest pain. Allergies  Other; Adhesive tape; Iodine; Penicillins; Sulfa antibiotics; Sulfasalazine; and Clindamycin/lincomycin      Vitals  /80   Pulse 78   Temp 97.1 °F (36.2 °C)   Resp 12   Ht 5' 5\" (1.651 m)   Wt 185 lb (83.9 kg)   LMP 01/10/2012   BMI 30.79 kg/m²     Current Medications  Current Outpatient Medications   Medication Sig Dispense Refill    amoxicillin (AMOXIL) 500 MG capsule Take 1 capsule by mouth 2 times daily for 7 days 14 capsule 0    magnesium 30 MG tablet Take 400 mg by mouth nightly       gabapentin (NEURONTIN) 100 MG capsule Take 100 mg by mouth 3 times daily.       famotidine (PEPCID) 20 MG tablet Take 1 tablet by mouth 2 times daily 60 tablet 0    Cholecalciferol 2000 units TABS Take 6,000 Units by mouth daily      spironolactone (ALDACTONE) 50 MG tablet Take 100 mg by mouth daily   1    fluticasone (FLONASE) 50 MCG/ACT nasal spray 2 sprays by Nasal route daily 1 Bottle 5    cetirizine (ZYRTEC) 5 MG tablet Take 5 mg by mouth daily      vitamin B-12 (CYANOCOBALAMIN) 100 MCG tablet Take 50 mcg by mouth daily       No current facility-administered medications for this visit. Past Medical History  Past Medical History:   Diagnosis Date    Allergic rhinitis     KIERSTEN (obstructive sleep apnea)     PONV (postoperative nausea and vomiting)        Social History  Social History     Socioeconomic History    Marital status: Single     Spouse name: Not on file    Number of children: Not on file    Years of education: Not on file    Highest education level: Not on file   Occupational History    Not on file   Social Needs    Financial resource strain: Not on file    Food insecurity     Worry: Not on file     Inability: Not on file    Transportation needs     Medical: Not on file     Non-medical: Not on file   Tobacco Use    Smoking status: Never Smoker    Smokeless tobacco: Never Used   Substance and Sexual Activity    Alcohol use:  Yes     Alcohol/week: 1.0 standard drinks     Types: 1 Glasses of wine per week     Comment: nightly    Drug use: Not on file    Sexual activity: Not on file   Lifestyle    Physical activity     Days per week: Not on file     Minutes per session: Not on file    Stress: Not on file   Relationships    Social connections     Talks on phone: Not on file     Gets together: Not on file     Attends Scientologist service: Not on file     Active member of club or organization: Not on file     Attends meetings of clubs or organizations: Not on file     Relationship status: Not on file    Intimate partner violence     Fear of current or ex partner: Not on file     Emotionally abused: Not on file     Physically abused: Not on file     Forced sexual activity: Not on file   Other Topics Concern    Not on file Social History Narrative    Not on file       SurgicalHistory  Past Surgical History:   Procedure Laterality Date    BREAST RECONSTRUCTION      benign tumors    COLONOSCOPY  10/09/2015    HERNIA REPAIR      JOINT REPLACEMENT      left - 6/2019, right 12/2019    KNEE SURGERY      NOSE SURGERY      TONSILLECTOMY         Physical Exam  Physical Exam  Constitutional:       Appearance: Normal appearance. She is not ill-appearing or diaphoretic. HENT:      Head: Normocephalic and atraumatic. Right Ear: Tympanic membrane and ear canal normal.      Left Ear: Tympanic membrane and ear canal normal.      Nose: Rhinorrhea present. Comments: Left sided maxillary sinus tenderness     Mouth/Throat:      Mouth: Mucous membranes are moist.      Pharynx: No oropharyngeal exudate or posterior oropharyngeal erythema. Eyes:      Pupils: Pupils are equal, round, and reactive to light. Cardiovascular:      Rate and Rhythm: Normal rate and regular rhythm. Pulmonary:      Effort: Pulmonary effort is normal.      Breath sounds: Normal breath sounds. Neurological:      Mental Status: She is alert. Psychiatric:         Mood and Affect: Mood normal.         Behavior: Behavior normal.           Assessment    1. Acute bacterial sinusitis    2. Hospital discharge follow-up    3. Drug Allergy    4. Allergic Reaction, subsequent encounter      Plan    Rash has completely resolved. Clindamycin has been recorded as an allergy. Start Amoxil for sinusitis. May take flonase and OTC Claritin, Allegra or Zyrtec to help with symptoms. Return in about 1 month (around 11/26/2020). Sailaja Poon PA-C 3:30 PM 10/26/2020      Post-Discharge Transitional Care Management Services or Hospital Follow Up      Caitlin oMntgomery   YOB: 1955    Date of Office Visit:  10/26/2020  Date of Hospital Admission: 10/13/20  Date of Hospital Discharge: 10/13/20  Risk of hospital readmission (high >=14%. Medium >=10%) : No data recorded    Care management risk score Rising risk (score 2-5) and Complex Care (Scores >=6): 1     Non face to face  following discharge, date last encounter closed (first attempt may have been earlier): *No documented post hospital discharge outreach found in the last 14 days    Call initiated 2 business days of discharge: *No response recorded in the last 14 days    Patient Active Problem List   Diagnosis    Arthritis    Pedal edema    Sleep apnea    Osteoarthritis of finger of left hand    Arthritis of left hip       Allergies   Allergen Reactions    Other Anaphylaxis     CLAMS    Adhesive Tape     Iodine     Penicillins     Sulfa Antibiotics     Sulfasalazine     Clindamycin/Lincomycin Rash       Medications listed as ordered at the time of discharge from [address] Medication Instructions TOBY:    Printed on:10/26/20 7550   Medication Information                      amoxicillin (AMOXIL) 500 MG capsule  Take 1 capsule by mouth 2 times daily for 7 days             cetirizine (ZYRTEC) 5 MG tablet  Take 5 mg by mouth daily             Cholecalciferol 2000 units TABS  Take 6,000 Units by mouth daily             famotidine (PEPCID) 20 MG tablet  Take 1 tablet by mouth 2 times daily             fluticasone (FLONASE) 50 MCG/ACT nasal spray  2 sprays by Nasal route daily             gabapentin (NEURONTIN) 100 MG capsule  Take 100 mg by mouth 3 times daily.              magnesium 30 MG tablet  Take 400 mg by mouth nightly              spironolactone (ALDACTONE) 50 MG tablet  Take 100 mg by mouth daily              vitamin B-12 (CYANOCOBALAMIN) 100 MCG tablet  Take 50 mcg by mouth daily                   Medications marked \"taking\" at this time  Outpatient Medications Marked as Taking for the 10/26/20 encounter (Office Visit) with POLA Love   Medication Sig Dispense Refill    amoxicillin (AMOXIL) 500 MG capsule Take 1 capsule by mouth 2 times daily for 7 days 14 capsule 0        Medications patient taking as of now reconciled against medications ordered at time of hospital discharge: Yes    Chief Complaint   Patient presents with   [address] from Hospital    Sinusitis       History of Present illness - Follow up of Hospital diagnosis(es): Presents for ER follow up. Pt was seen in ED on 10/13 for an allergic reaction. Pt had developed a sinus infection in early October. Sent for COVID test which was negative. She continued to have sinus discomfort and pt went to dentist where she had xrays that noted a sinus infection. Pt was started on Clindamycin. About 2 days later, pt developed a diffuse painful, itchy rash. She went to the ED. She was given Pepcid, Decadron and Benadryl. She was sent home with Benadryl and Prednisone and told to discontinue Clindamycin. She reports in about a weeks time, the rash began to fade and pt felt much improvement. She does report still having sinus pain and tenderness. Pt states she took 7 days of Clindamycin. Inpatient course: Discharge summary reviewed- see chart. Interval history/Current status: stable    A comprehensive review of systems was negative except for what was noted in the HPI. Vitals:    10/26/20 1042   BP: 120/80   Pulse: 78   Resp: 12   Temp: 97.1 °F (36.2 °C)   Weight: 185 lb (83.9 kg)   Height: 5' 5\" (1.651 m)     Body mass index is 30.79 kg/m². Wt Readings from Last 3 Encounters:   10/26/20 185 lb (83.9 kg)   10/13/20 185 lb (83.9 kg)   11/05/19 186 lb (84.4 kg)     BP Readings from Last 3 Encounters:   10/26/20 120/80   10/13/20 130/61   11/05/19 125/75      Physical Exam  Constitutional:       Appearance: Normal appearance. She is not ill-appearing or diaphoretic. HENT:      Head: Normocephalic and atraumatic. Right Ear: Tympanic membrane and ear canal normal.      Left Ear: Tympanic membrane and ear canal normal.      Nose: Rhinorrhea present.       Comments: Left sided maxillary sinus tenderness

## 2020-12-30 ENCOUNTER — OFFICE VISIT (OUTPATIENT)
Dept: PRIMARY CARE CLINIC | Age: 65
End: 2020-12-30
Payer: COMMERCIAL

## 2020-12-30 DIAGNOSIS — Z11.59 SCREENING FOR VIRAL DISEASE: Primary | ICD-10-CM

## 2020-12-30 PROCEDURE — 99211 OFF/OP EST MAY X REQ PHY/QHP: CPT | Performed by: NURSE PRACTITIONER

## 2020-12-30 NOTE — PATIENT INSTRUCTIONS

## 2020-12-30 NOTE — PROGRESS NOTES
Jeramie Gan received a viral test for COVID-19. They were educated on isolation and quarantine as appropriate. For any symptoms, they were directed to seek care from their PCP, given contact information to establish with a doctor, directed to an urgent care or the emergency room.

## 2021-01-01 LAB — SARS-COV-2, NAA: NOT DETECTED

## 2021-02-08 DIAGNOSIS — R92.8 ABNORMAL MAMMOGRAM: Primary | ICD-10-CM

## 2021-02-11 DIAGNOSIS — R92.8 ABNORMAL MAMMOGRAM: Primary | ICD-10-CM

## 2021-06-29 ENCOUNTER — TELEPHONE (OUTPATIENT)
Dept: INTERNAL MEDICINE CLINIC | Age: 66
End: 2021-06-29

## 2021-06-29 NOTE — TELEPHONE ENCOUNTER
----- Message from Kathryn Mann MD sent at 6/29/2021  1:00 PM EDT -----  Contact: [phone]  Tomorrow  ----- Message -----  From: Zac Martinez  Sent: 6/29/2021  12:20 PM EDT  To: Kathryn Mann MD    Pt has believes she is having heart palpitations started 3 months ago and seems to be getting more frequent feels fluttering and racing but no chest pain or SOB wanted an appointment if possible please.

## 2021-06-30 ENCOUNTER — OFFICE VISIT (OUTPATIENT)
Dept: INTERNAL MEDICINE CLINIC | Age: 66
End: 2021-06-30

## 2021-06-30 ENCOUNTER — HOSPITAL ENCOUNTER (OUTPATIENT)
Age: 66
Discharge: HOME | End: 2021-06-30
Payer: COMMERCIAL

## 2021-06-30 VITALS
WEIGHT: 184 LBS | SYSTOLIC BLOOD PRESSURE: 140 MMHG | HEIGHT: 65 IN | BODY MASS INDEX: 30.66 KG/M2 | HEART RATE: 70 BPM | DIASTOLIC BLOOD PRESSURE: 78 MMHG | RESPIRATION RATE: 18 BRPM

## 2021-06-30 DIAGNOSIS — R00.2 HEART PALPITATIONS: Primary | ICD-10-CM

## 2021-06-30 DIAGNOSIS — R00.2 HEART PALPITATIONS: ICD-10-CM

## 2021-06-30 DIAGNOSIS — Z13.220 LIPID SCREENING: ICD-10-CM

## 2021-06-30 DIAGNOSIS — R60.0 PEDAL EDEMA: ICD-10-CM

## 2021-06-30 LAB
A/G RATIO: 1.5 (ref 1.1–2.2)
ALBUMIN SERPL-MCNC: 4.4 G/DL (ref 3.4–5)
ALP BLD-CCNC: 70 U/L (ref 40–129)
ALT SERPL-CCNC: 16 U/L (ref 10–40)
ANION GAP SERPL CALCULATED.3IONS-SCNC: 13 MMOL/L (ref 3–16)
AST SERPL-CCNC: 18 U/L (ref 15–37)
BASOPHILS ABSOLUTE: 0 K/UL (ref 0–0.2)
BASOPHILS RELATIVE PERCENT: 0.6 %
BILIRUB SERPL-MCNC: 0.8 MG/DL (ref 0–1)
BUN BLDV-MCNC: 10 MG/DL (ref 7–20)
CALCIUM SERPL-MCNC: 9 MG/DL (ref 8.3–10.6)
CHLORIDE BLD-SCNC: 101 MMOL/L (ref 99–110)
CHOLESTEROL, FASTING: 213 MG/DL (ref 0–199)
CO2: 24 MMOL/L (ref 21–32)
CREAT SERPL-MCNC: 0.7 MG/DL (ref 0.6–1.2)
D DIMER: <200 NG/ML DDU (ref 0–229)
EOSINOPHILS ABSOLUTE: 0.2 K/UL (ref 0–0.6)
EOSINOPHILS RELATIVE PERCENT: 3.7 %
GFR AFRICAN AMERICAN: >60
GFR NON-AFRICAN AMERICAN: >60
GLOBULIN: 2.9 G/DL
GLUCOSE BLD-MCNC: 107 MG/DL (ref 70–99)
HCT VFR BLD CALC: 46.6 % (ref 36–48)
HDLC SERPL-MCNC: 53 MG/DL (ref 40–60)
HEMOGLOBIN: 16.1 G/DL (ref 12–16)
LDL CHOLESTEROL CALCULATED: 139 MG/DL
LYMPHOCYTES ABSOLUTE: 1.5 K/UL (ref 1–5.1)
LYMPHOCYTES RELATIVE PERCENT: 29.2 %
MAGNESIUM: 2.5 MG/DL (ref 1.8–2.4)
MCH RBC QN AUTO: 32.5 PG (ref 26–34)
MCHC RBC AUTO-ENTMCNC: 34.5 G/DL (ref 31–36)
MCV RBC AUTO: 94.1 FL (ref 80–100)
MONOCYTES ABSOLUTE: 0.5 K/UL (ref 0–1.3)
MONOCYTES RELATIVE PERCENT: 10.6 %
NEUTROPHILS ABSOLUTE: 2.8 K/UL (ref 1.7–7.7)
NEUTROPHILS RELATIVE PERCENT: 55.9 %
PDW BLD-RTO: 15.6 % (ref 12.4–15.4)
PLATELET # BLD: 330 K/UL (ref 135–450)
PMV BLD AUTO: 8.1 FL (ref 5–10.5)
POTASSIUM SERPL-SCNC: 4.5 MMOL/L (ref 3.5–5.1)
RBC # BLD: 4.95 M/UL (ref 4–5.2)
SODIUM BLD-SCNC: 138 MMOL/L (ref 136–145)
TOTAL PROTEIN: 7.3 G/DL (ref 6.4–8.2)
TRIGLYCERIDE, FASTING: 107 MG/DL (ref 0–150)
VLDLC SERPL CALC-MCNC: 21 MG/DL
WBC # BLD: 5 K/UL (ref 4–11)

## 2021-06-30 PROCEDURE — 83735 ASSAY OF MAGNESIUM: CPT

## 2021-06-30 PROCEDURE — 99212 OFFICE O/P EST SF 10 MIN: CPT | Performed by: INTERNAL MEDICINE

## 2021-06-30 PROCEDURE — 80061 LIPID PANEL: CPT

## 2021-06-30 PROCEDURE — 36415 COLL VENOUS BLD VENIPUNCTURE: CPT

## 2021-06-30 PROCEDURE — 93000 ELECTROCARDIOGRAM COMPLETE: CPT | Performed by: INTERNAL MEDICINE

## 2021-06-30 PROCEDURE — 85379 FIBRIN DEGRADATION QUANT: CPT

## 2021-06-30 PROCEDURE — 80053 COMPREHEN METABOLIC PANEL: CPT

## 2021-06-30 PROCEDURE — 85025 COMPLETE CBC W/AUTO DIFF WBC: CPT

## 2021-06-30 RX ORDER — MAGNESIUM OXIDE 400 MG/1
800 TABLET ORAL NIGHTLY
COMMUNITY

## 2021-06-30 ASSESSMENT — ENCOUNTER SYMPTOMS
COLOR CHANGE: 0
PHOTOPHOBIA: 0
ABDOMINAL PAIN: 0
CHEST TIGHTNESS: 0
SHORTNESS OF BREATH: 0
CONSTIPATION: 0
NAUSEA: 0
TROUBLE SWALLOWING: 0

## 2021-06-30 ASSESSMENT — PATIENT HEALTH QUESTIONNAIRE - PHQ9
SUM OF ALL RESPONSES TO PHQ QUESTIONS 1-9: 0
SUM OF ALL RESPONSES TO PHQ QUESTIONS 1-9: 0
2. FEELING DOWN, DEPRESSED OR HOPELESS: 0
SUM OF ALL RESPONSES TO PHQ QUESTIONS 1-9: 0
SUM OF ALL RESPONSES TO PHQ9 QUESTIONS 1 & 2: 0
1. LITTLE INTEREST OR PLEASURE IN DOING THINGS: 0

## 2021-06-30 NOTE — PROGRESS NOTES
Subjective:      Patient ID: Ayo Oliver is a 63 y.o. female. HPI    63 y.o. female here for ongoing heart palpitations for last 3 months mostly happening at night when she wakes up for bathroom. Feels like heart is pounding and irregular, gets heavy and then makes her sob. Occasionally happened in the daytime as well     Recently suffered covid infection 6 weeks ago    Pt currently on aldactone , testosterone, estrogen supplements from her GYN. Also on mag supplements, gabapentin     No previous cardiac issues or pulm issues  Non smoker       Reports doing well since last time, pt had both hip replacements in last 2 yrs     Past Medical History:   Diagnosis Date    Allergic rhinitis     KIERSTEN (obstructive sleep apnea)     PONV (postoperative nausea and vomiting)      Past Surgical History:   Procedure Laterality Date    BREAST RECONSTRUCTION      benign tumors    COLONOSCOPY  10/09/2015    HERNIA REPAIR      JOINT REPLACEMENT      left - 6/2019, right 12/2019    KNEE SURGERY      NOSE SURGERY      TONSILLECTOMY     . Allergies   Allergen Reactions    Other Anaphylaxis     CLAMS    Adhesive Tape     Iodine     Penicillins     Sulfa Antibiotics     Sulfasalazine     Clindamycin/Lincomycin Rash     Social History     Socioeconomic History    Marital status: Single     Spouse name: Not on file    Number of children: Not on file    Years of education: Not on file    Highest education level: Not on file   Occupational History    Not on file   Tobacco Use    Smoking status: Never Smoker    Smokeless tobacco: Never Used   Substance and Sexual Activity    Alcohol use:  Yes     Alcohol/week: 1.0 standard drinks     Types: 1 Glasses of wine per week     Comment: nightly    Drug use: Not on file    Sexual activity: Not on file   Other Topics Concern    Not on file   Social History Narrative    Not on file     Social Determinants of Health     Financial Resource Strain:     Difficulty of Paying Living Expenses:    Food Insecurity:     Worried About Running Out of Food in the Last Year:     [address] in the Last Year:    Transportation Needs:     Lack of Transportation (Medical):  Lack of Transportation (Non-Medical):    Physical Activity:     Days of Exercise per Week:     Minutes of Exercise per Session:    Stress:     Feeling of Stress :    Social Connections:     Frequency of Communication with Friends and Family:     Frequency of Social Gatherings with Friends and Family:     Attends Pentecostal Services:     Active Member of Clubs or Organizations:     Attends Club or Organization Meetings:     Marital Status:    Intimate Partner Violence:     Fear of Current or Ex-Partner:     Emotionally Abused:     Physically Abused:     Sexually Abused:      No family history on file. Review of Systems   Constitutional: Negative for activity change, diaphoresis and unexpected weight change. HENT: Negative for congestion, ear discharge, hearing loss and trouble swallowing. Eyes: Negative for photophobia and visual disturbance. Respiratory: Negative for chest tightness and shortness of breath. Cardiovascular: Positive for chest pain and palpitations. Gastrointestinal: Negative for abdominal pain, constipation and nausea. Endocrine: Negative for cold intolerance, heat intolerance and polyuria. Genitourinary: Negative for dysuria, flank pain and hematuria. Musculoskeletal: Negative for arthralgias and gait problem. Skin: Negative for color change. Allergic/Immunologic: Negative for immunocompromised state. Neurological: Negative for tremors, seizures, weakness and numbness. Psychiatric/Behavioral: Negative for decreased concentration and sleep disturbance. The patient is not nervous/anxious. Objective:   Physical Exam  There were no vitals filed for this visit. General: elderly healthy female,   Awake, alert and oriented.  Appears to be not in any distress  Mucous Membranes:  Pink , anicteric  No Submandibular LN palpable  Neck: No JVD, no carotid bruit, no thyromegaly  Chest:  Clear to auscultation bilaterally, no added sounds  Cardiovascular:  RRR S1S2 heard, no murmurs or gallops  Abdomen:  Soft, undistended, non tender, no organomegaly, BS present  Extremities: No edema or cyanosis. Distal pulses well felt  Neurological : grossly normal    EKG - left atrial enlargement, NSR  Assessment:       Diagnosis Orders   1.  Heart palpitations  EKG 12 lead           Plan:         Heart palpitations - EKG with no acute abn, chronic left atrial enlargement    Obtain echo and 24 hr monitor     Elevated Bp with no htn, pt to monitor   Already on aldactone 50 mg daily   Low salt diet    Abn mammo - MRI neg for any masses    Hip OA - s/p bilateral hip replacements      Pt on harmone replacements by her GYN  Low concerns of PE           Had colonoscopy 2015   Had mamm[facility] last year at [facility]   Increase activity and lose weight      Refuses covid vaccine     Harpreet Minaya MD

## 2021-07-01 ENCOUNTER — TELEPHONE (OUTPATIENT)
Dept: INTERNAL MEDICINE CLINIC | Age: 66
End: 2021-07-01

## 2021-07-01 DIAGNOSIS — Z00.00 ROUTINE GENERAL MEDICAL EXAMINATION AT A HEALTH CARE FACILITY: Primary | ICD-10-CM

## 2021-07-01 RX ORDER — ATORVASTATIN CALCIUM 20 MG/1
20 TABLET, FILM COATED ORAL NIGHTLY
Qty: 30 TABLET | Refills: 1 | Status: SHIPPED | OUTPATIENT
Start: 2021-07-01 | End: 2021-08-30

## 2021-07-07 RX ORDER — METOPROLOL SUCCINATE 25 MG/1
25 TABLET, EXTENDED RELEASE ORAL DAILY
Qty: 90 TABLET | Refills: 1 | Status: SHIPPED | OUTPATIENT
Start: 2021-07-07 | End: 2022-01-10

## 2021-07-13 ENCOUNTER — HOSPITAL ENCOUNTER (OUTPATIENT)
Dept: CARDIOLOGY | Age: 66
Discharge: HOME | End: 2021-07-13
Payer: COMMERCIAL

## 2021-07-13 DIAGNOSIS — R00.2 HEART PALPITATIONS: ICD-10-CM

## 2021-07-13 LAB
LV EF: 55 %
LVEF MODALITY: NORMAL

## 2021-07-13 PROCEDURE — 93306 TTE W/DOPPLER COMPLETE: CPT

## 2021-08-30 RX ORDER — ATORVASTATIN CALCIUM 20 MG/1
TABLET, FILM COATED ORAL
Qty: 30 TABLET | Refills: 0 | Status: SHIPPED | OUTPATIENT
Start: 2021-08-30 | End: 2021-09-30

## 2022-01-10 RX ORDER — METOPROLOL SUCCINATE 25 MG/1
TABLET, EXTENDED RELEASE ORAL
Qty: 90 TABLET | Refills: 1 | Status: SHIPPED | OUTPATIENT
Start: 2022-01-10 | End: 2022-06-09

## 2022-01-25 ENCOUNTER — OFFICE VISIT (OUTPATIENT)
Dept: INTERNAL MEDICINE CLINIC | Age: 67
End: 2022-01-25

## 2022-01-25 VITALS
DIASTOLIC BLOOD PRESSURE: 70 MMHG | HEART RATE: 52 BPM | BODY MASS INDEX: 31.65 KG/M2 | HEIGHT: 65 IN | SYSTOLIC BLOOD PRESSURE: 140 MMHG | RESPIRATION RATE: 18 BRPM | WEIGHT: 190 LBS

## 2022-01-25 DIAGNOSIS — E78.2 MIXED HYPERLIPIDEMIA: ICD-10-CM

## 2022-01-25 DIAGNOSIS — R00.2 HEART PALPITATIONS: Primary | ICD-10-CM

## 2022-01-25 PROCEDURE — 99212 OFFICE O/P EST SF 10 MIN: CPT | Performed by: INTERNAL MEDICINE

## 2022-01-25 RX ORDER — GABAPENTIN 300 MG/1
300 CAPSULE ORAL NIGHTLY
COMMUNITY
Start: 2022-01-10 | End: 2022-05-20 | Stop reason: SDUPTHER

## 2022-01-25 ASSESSMENT — ENCOUNTER SYMPTOMS
NAUSEA: 0
CONSTIPATION: 0
CHEST TIGHTNESS: 0
PHOTOPHOBIA: 0
SHORTNESS OF BREATH: 0
ABDOMINAL PAIN: 0
COLOR CHANGE: 0
TROUBLE SWALLOWING: 0

## 2022-01-25 NOTE — PROGRESS NOTES
Subjective:      Patient ID: Gianna Keith is a 61 y.o. female. HPI    61 y.o. female with hx of palpitations, hyperlipidemia here for regular f.w    Since last time, pt had holter monitor showing sinus tachycardia going upto 160's with symptoms and started on low dose BB and tolerating well except for ongoing weight gain   No previous cardiac issues or pulm issues  Non smoker     Hyperlipidemia - started on statins but stopped 2 months for unspecified reasons of weight gain       Pt currently on aldactone for chronic pedal edam by GYN    Was on testosterone, estrogen supplements from her GYN. Recently changed it to pellet which is inserted on her hip area    Also on mag supplements, gabapentin by GYN      Reports doing well since last time, pt had both hip replacements for OA     Allergies   Allergen Reactions    Other Anaphylaxis     CLAMS    Adhesive Tape     Iodine     Penicillins     Sulfa Antibiotics     Sulfasalazine     Clindamycin/Lincomycin Rash         Past Medical History:   Diagnosis Date    Allergic rhinitis     KIERSTEN (obstructive sleep apnea)     PONV (postoperative nausea and vomiting)      Past Surgical History:   Procedure Laterality Date    BREAST RECONSTRUCTION      benign tumors    COLONOSCOPY  10/09/2015    HERNIA REPAIR      JOINT REPLACEMENT      left - 6/2019, right 12/2019    KNEE SURGERY      NOSE SURGERY      TONSILLECTOMY     .   Allergies   Allergen Reactions    Other Anaphylaxis     CLAMS    Adhesive Tape     Iodine     Penicillins     Sulfa Antibiotics     Sulfasalazine     Clindamycin/Lincomycin Rash     Social History     Socioeconomic History    Marital status: Single     Spouse name: Not on file    Number of children: Not on file    Years of education: Not on file    Highest education level: Not on file   Occupational History    Not on file   Tobacco Use    Smoking status: Never Smoker    Smokeless tobacco: Never Used   Substance and Sexual Activity    Alcohol use: Yes     Alcohol/week: 1.0 standard drink     Types: 1 Glasses of wine per week     Comment: nightly    Drug use: Not on file    Sexual activity: Not on file   Other Topics Concern    Not on file   Social History Narrative    Not on file     Social Determinants of Health     Financial Resource Strain:     Difficulty of Paying Living Expenses: Not on file   Food Insecurity:     Worried About Running Out of Food in the Last Year: Not on file    Carrie of Food in the Last Year: Not on file   Transportation Needs:     Lack of Transportation (Medical): Not on file    Lack of Transportation (Non-Medical): Not on file   Physical Activity:     Days of Exercise per Week: Not on file    Minutes of Exercise per Session: Not on file   Stress:     Feeling of Stress : Not on file   Social Connections:     Frequency of Communication with Friends and Family: Not on file    Frequency of Social Gatherings with Friends and Family: Not on file    Attends Christian Services: Not on file    Active Member of [address] [place] [address] or Organizations: Not on file    Attends Club or Organization Meetings: Not on file    Marital Status: Not on file   Intimate Partner Violence:     Fear of Current or Ex-Partner: Not on file    Emotionally Abused: Not on file    Physically Abused: Not on file    Sexually Abused: Not on file   Housing Stability:     Unable to Pay for Housing in the Last Year: Not on file    Number of [place] in the Last Year: Not on file    Unstable Housing in the Last Year: Not on file     No family history on file. Review of Systems   Constitutional: Negative for activity change, diaphoresis and unexpected weight change. HENT: Negative for congestion, ear discharge, hearing loss and trouble swallowing. Eyes: Negative for photophobia and visual disturbance. Respiratory: Negative for chest tightness and shortness of breath.     Cardiovascular: Positive for chest pain and palpitations. Gastrointestinal: Negative for abdominal pain, constipation and nausea. Endocrine: Negative for cold intolerance, heat intolerance and polyuria. Genitourinary: Negative for dysuria, flank pain and hematuria. Musculoskeletal: Negative for arthralgias and gait problem. Skin: Negative for color change. Allergic/Immunologic: Negative for immunocompromised state. Neurological: Negative for tremors, seizures, weakness and numbness. Psychiatric/Behavioral: Negative for decreased concentration and sleep disturbance. The patient is not nervous/anxious. Objective:   Physical Exam  There were no vitals filed for this visit. General: elderly healthy female,   Awake, alert and oriented. Appears to be not in any distress  Mucous Membranes:  Pink , anicteric  No Submandibular LN palpable  Neck: No JVD, no carotid bruit, no thyromegaly  Chest:  Clear to auscultation bilaterally, no added sounds  Cardiovascular:  RRR S1S2 heard, no murmurs or gallops  Abdomen:  Soft, undistended, non tender, no organomegaly, BS present  Extremities: No edema or cyanosis. Distal pulses well felt  Neurological : grossly normal        ECHO 7/21     Normal left ventricle systolic function with an estimated ejection fraction    of 55%.    No regional wall motion abnormalities are seen.    Normal left ventricular diastolic filling pressure.    Mild mitral regurgitation.    Trace tricuspid regurgitation.    Systolic pulmonary artery pressure (SPAP) is normal and estimated at 20 mmHg    (right atrial pressure 3 mmHg). Assessment:       Diagnosis Orders   1. Heart palpitations  CBC WITH AUTO DIFFERENTIAL    COMPREHENSIVE METABOLIC PANEL   2.  Mixed hyperlipidemia  Lipid, Fasting           Plan:         Heart palpitations - intermittent sinus tach on holter  - now on BB low  Dose, toprol 25 mg daily    helping her symptoms  Continue mag supplements    Hyperlipidemia -was on lipitor but stopped  Check lipids      Elevated Bp with no htn, pt to monitor   Already on aldactone 100 mg daily   Low salt diet  Pending sleep study soon       Abn mammo - MRI neg for any masses    Hip OA - s/p bilateral hip replacements      Pt on harmone replacements by her GYN  Low concerns of PE       Pending KIERSTEN screen    Had colonoscopy 2015   Had mammo last year at [facility]   Increase activity and lose weight      Refuses covid vaccine     Vinh Felton MD

## 2022-01-27 DIAGNOSIS — R73.01 ELEVATED FASTING GLUCOSE: Primary | ICD-10-CM

## 2022-03-14 ENCOUNTER — TELEPHONE (OUTPATIENT)
Dept: INTERNAL MEDICINE CLINIC | Age: 67
End: 2022-03-14

## 2022-03-14 DIAGNOSIS — J06.9 UPPER RESPIRATORY TRACT INFECTION, UNSPECIFIED TYPE: Primary | ICD-10-CM

## 2022-03-14 RX ORDER — AZITHROMYCIN 250 MG/1
250 TABLET, FILM COATED ORAL SEE ADMIN INSTRUCTIONS
Qty: 6 TABLET | Refills: 0 | Status: SHIPPED | OUTPATIENT
Start: 2022-03-14 | End: 2022-03-19

## 2022-03-14 NOTE — TELEPHONE ENCOUNTER
----- Message from Jasen Bailey MD sent at 3/14/2022 10:03 AM EDT -----  Contact: Brittany Keller  [phone]  Start on z pack if no allergies  Also try mucinex   See us if not better  ----- Message -----  From: Angella Boyer  Sent: 3/14/2022   9:32 AM EDT  To: Jasen Bailey MD    Allergy to PCN, Sulfa, and Clindamycin. Please advise.   ----- Message -----  From: Jasen Bailey MD  Sent: 3/14/2022   9:26 AM EDT  To: Daniel Thomasott    Amox 500 mg tid x 5 days  ----- Message -----  From: Sage Costello  Sent: 3/14/2022   9:11 AM EDT  To: Jasen Bailey MD    Patient called and stated thatt once a year she gets the sinus congestion in her head. Patient states that she has no fever, no body aches, no nausea, Patient states blowing a lot of green out of her head.     [address], [address] [place] [address] [phone] - F [phone] (Ph: [phone])

## 2022-04-06 ENCOUNTER — TELEPHONE (OUTPATIENT)
Dept: INTERNAL MEDICINE CLINIC | Age: 67
End: 2022-04-06

## 2022-04-06 RX ORDER — CIPROFLOXACIN 250 MG/1
250 TABLET, FILM COATED ORAL 2 TIMES DAILY
Qty: 14 TABLET | Refills: 0 | Status: SHIPPED | OUTPATIENT
Start: 2022-04-06 | End: 2022-07-06 | Stop reason: ALTCHOICE

## 2022-04-06 NOTE — TELEPHONE ENCOUNTER
----- Message from Donna Moon MD sent at 4/6/2022  4:05 PM EDT -----  Contact: [phone] (H)  Cipro 250 mg po bid #14  ----- Message -----  From: Denver Guevara MA  Sent: 4/6/2022   4:00 PM EDT  To: MD Dr Donna Blair pt URINARY    Patient calling with symptoms of possible urinary tract infection  Symptoms include frequency, urgency, abnormal smelling urine, foul smelling urine, burning with urination, incomplete bladder emptying, incontinence  Symptoms have persisted for 2 days  Patient is also complaining of vaginal discharge  When was their last UTI 3 years  Unable to come in to leave a sample       [facility] [id number] [place], [place] - [address] [facility] [phone] - F [phone] (Ph: [phone])

## 2022-05-20 RX ORDER — GABAPENTIN 300 MG/1
600 CAPSULE ORAL NIGHTLY
Qty: 60 CAPSULE | Refills: 0 | Status: SHIPPED | OUTPATIENT
Start: 2022-05-20 | End: 2022-06-23

## 2022-05-20 NOTE — TELEPHONE ENCOUNTER
----- Message from Warren Catherine MD sent at 5/20/2022  3:06 PM EDT -----  Contact: [phone] (H)  Ok to do so  ----- Message -----  From: Mathew Cisse  Sent: 5/20/2022   1:04 PM EDT  To: Warren Catherine MD    Pt was wondering if you would take over prescribing gabapentin (NEURONTIN) 300 MG capsule? If so, she will need a refill. Pleas send it to [facility] [id number] [facility], [address] [place] [address] [phone] - F [phone] (Ph: [phone]).     Thank you

## 2022-06-09 RX ORDER — METOPROLOL SUCCINATE 25 MG/1
TABLET, EXTENDED RELEASE ORAL
Qty: 90 TABLET | Refills: 0 | Status: SHIPPED
Start: 2022-06-09 | End: 2022-06-13 | Stop reason: DRUGHIGH

## 2022-06-13 RX ORDER — METOPROLOL TARTRATE 37.5 MG/1
1 TABLET, FILM COATED ORAL DAILY
Qty: 90 TABLET | Refills: 0 | Status: SHIPPED | OUTPATIENT
Start: 2022-06-13 | End: 2022-09-23

## 2022-06-13 NOTE — TELEPHONE ENCOUNTER
----- Message from [facility] sent at 6/13/2022  8:53 AM EDT -----  Contact: [phone] (L)    ----- Message -----  From: Warren Catherine MD  Sent: 6/13/2022   8:44 AM EDT  To: Anderson Stanley do  ----- Message -----  From: Mathew Hsuigan  Sent: 6/10/2022   4:42 PM EDT  To: Warren Catherine MD    Pt called and stated that her heart surgeon increased her medication metoprolol succinate (TOPROL XL) tp 37.5 mg per tablet. She was wondering if you could update her prescription. She will also need a refill. [facility] 21 [id number] [facility], [address] [place] [address] [address] [facility] [phone] - F [phone] (Ph: [phone]).     Thank you

## 2022-06-23 RX ORDER — GABAPENTIN 300 MG/1
600 CAPSULE ORAL NIGHTLY
Qty: 60 CAPSULE | Refills: 0 | Status: SHIPPED | OUTPATIENT
Start: 2022-06-23 | End: 2022-07-25

## 2022-06-30 DIAGNOSIS — R73.01 ELEVATED FASTING GLUCOSE: ICD-10-CM

## 2022-06-30 DIAGNOSIS — R00.2 HEART PALPITATIONS: ICD-10-CM

## 2022-06-30 DIAGNOSIS — E78.2 MIXED HYPERLIPIDEMIA: ICD-10-CM

## 2022-06-30 LAB
A/G RATIO: 1.9 (ref 1.1–2.2)
ALBUMIN SERPL-MCNC: 4 G/DL (ref 3.4–5)
ALP BLD-CCNC: 71 U/L (ref 40–129)
ALT SERPL-CCNC: 12 U/L (ref 10–40)
ANION GAP SERPL CALCULATED.3IONS-SCNC: 14 MMOL/L (ref 3–16)
AST SERPL-CCNC: 14 U/L (ref 15–37)
BASOPHILS ABSOLUTE: 0 K/UL (ref 0–0.2)
BASOPHILS RELATIVE PERCENT: 0.3 %
BILIRUB SERPL-MCNC: 0.3 MG/DL (ref 0–1)
BUN BLDV-MCNC: 15 MG/DL (ref 7–20)
CALCIUM SERPL-MCNC: 8.9 MG/DL (ref 8.3–10.6)
CHLORIDE BLD-SCNC: 101 MMOL/L (ref 99–110)
CHOLESTEROL, FASTING: 179 MG/DL (ref 0–199)
CO2: 23 MMOL/L (ref 21–32)
CREAT SERPL-MCNC: 0.7 MG/DL (ref 0.6–1.2)
EOSINOPHILS ABSOLUTE: 0.2 K/UL (ref 0–0.6)
EOSINOPHILS RELATIVE PERCENT: 3.8 %
GFR AFRICAN AMERICAN: >60
GFR NON-AFRICAN AMERICAN: >60
GLUCOSE BLD-MCNC: 104 MG/DL (ref 70–99)
HCT VFR BLD CALC: 40.4 % (ref 36–48)
HDLC SERPL-MCNC: 36 MG/DL (ref 40–60)
HEMOGLOBIN: 13.7 G/DL (ref 12–16)
LDL CHOLESTEROL CALCULATED: 122 MG/DL
LYMPHOCYTES ABSOLUTE: 1.5 K/UL (ref 1–5.1)
LYMPHOCYTES RELATIVE PERCENT: 29.5 %
MCH RBC QN AUTO: 32.5 PG (ref 26–34)
MCHC RBC AUTO-ENTMCNC: 34 G/DL (ref 31–36)
MCV RBC AUTO: 95.5 FL (ref 80–100)
MONOCYTES ABSOLUTE: 0.4 K/UL (ref 0–1.3)
MONOCYTES RELATIVE PERCENT: 7.9 %
NEUTROPHILS ABSOLUTE: 3.1 K/UL (ref 1.7–7.7)
NEUTROPHILS RELATIVE PERCENT: 58.5 %
PDW BLD-RTO: 14 % (ref 12.4–15.4)
PLATELET # BLD: 306 K/UL (ref 135–450)
PMV BLD AUTO: 7.8 FL (ref 5–10.5)
POTASSIUM SERPL-SCNC: 4.5 MMOL/L (ref 3.5–5.1)
RBC # BLD: 4.23 M/UL (ref 4–5.2)
SODIUM BLD-SCNC: 138 MMOL/L (ref 136–145)
TOTAL PROTEIN: 6.1 G/DL (ref 6.4–8.2)
TRIGLYCERIDE, FASTING: 106 MG/DL (ref 0–150)
VLDLC SERPL CALC-MCNC: 21 MG/DL
WBC # BLD: 5.2 K/UL (ref 4–11)

## 2022-07-01 LAB
ESTIMATED AVERAGE GLUCOSE: 122.6 MG/DL
HBA1C MFR BLD: 5.9 %

## 2022-07-06 ENCOUNTER — OFFICE VISIT (OUTPATIENT)
Dept: INTERNAL MEDICINE CLINIC | Age: 67
End: 2022-07-06

## 2022-07-06 VITALS
HEART RATE: 55 BPM | SYSTOLIC BLOOD PRESSURE: 130 MMHG | BODY MASS INDEX: 30.82 KG/M2 | DIASTOLIC BLOOD PRESSURE: 65 MMHG | RESPIRATION RATE: 18 BRPM | WEIGHT: 185 LBS | HEIGHT: 65 IN

## 2022-07-06 DIAGNOSIS — Z00.00 ROUTINE GENERAL MEDICAL EXAMINATION AT A HEALTH CARE FACILITY: Primary | ICD-10-CM

## 2022-07-06 DIAGNOSIS — R00.2 HEART PALPITATIONS: ICD-10-CM

## 2022-07-06 DIAGNOSIS — Z23 NEED FOR TDAP VACCINATION: ICD-10-CM

## 2022-07-06 DIAGNOSIS — E78.2 MIXED HYPERLIPIDEMIA: ICD-10-CM

## 2022-07-06 DIAGNOSIS — M16.12 ARTHRITIS OF LEFT HIP: ICD-10-CM

## 2022-07-06 DIAGNOSIS — R73.01 ELEVATED FASTING GLUCOSE: ICD-10-CM

## 2022-07-06 PROCEDURE — 99396 PREV VISIT EST AGE 40-64: CPT | Performed by: INTERNAL MEDICINE

## 2022-07-06 PROCEDURE — 90715 TDAP VACCINE 7 YRS/> IM: CPT | Performed by: INTERNAL MEDICINE

## 2022-07-06 PROCEDURE — 90471 IMMUNIZATION ADMIN: CPT | Performed by: INTERNAL MEDICINE

## 2022-07-06 ASSESSMENT — ENCOUNTER SYMPTOMS
COLOR CHANGE: 0
SHORTNESS OF BREATH: 0
ABDOMINAL PAIN: 0
CONSTIPATION: 0
PHOTOPHOBIA: 0
CHEST TIGHTNESS: 0
NAUSEA: 0
TROUBLE SWALLOWING: 0

## 2022-07-06 NOTE — PROGRESS NOTES
Subjective:      Patient ID: Shabbir Graham is a 61 y.o. female. HPI    61 y.o. female with hx of palpitations, hyperlipidemia here for annual exam     Palpitations , pt had previous holter monitor showing sinus tachycardia going upto 160's with symptoms and started on low dose BB and now increased to 37.5 mg  and tolerating well   No previous cardiac issues or pulm issues  Non smoker     Hyperlipidemia - started on statins but stopped 3 months for unspecified reasons of weight gain       Pt currently on aldactone for chronic pedal edam by GYN    Was on testosterone, estrogen supplements from her GYN. Recently changed it to pellet which is inserted on her hip area    Also on mag supplements, gabapentin by GYN    OA of multiple joints , pt had both hip replacements for OA     No depression issues  No recent falls     Allergies   Allergen Reactions    Other Anaphylaxis     CLAMS    Adhesive Tape     Iodine     Penicillins     Sulfa Antibiotics     Sulfasalazine     Clindamycin/Lincomycin Rash         Past Medical History:   Diagnosis Date    Allergic rhinitis     KIERSTEN (obstructive sleep apnea)     PONV (postoperative nausea and vomiting)      Past Surgical History:   Procedure Laterality Date    BREAST RECONSTRUCTION      benign tumors    COLONOSCOPY  10/09/2015    HERNIA REPAIR      JOINT REPLACEMENT      left - 6/2019, right 12/2019    KNEE SURGERY      NOSE SURGERY      TONSILLECTOMY     .   Allergies   Allergen Reactions    Other Anaphylaxis     CLAMS    Adhesive Tape     Iodine     Penicillins     Sulfa Antibiotics     Sulfasalazine     Clindamycin/Lincomycin Rash     Social History     Socioeconomic History    Marital status: Single     Spouse name: Not on file    Number of children: Not on file    Years of education: Not on file    Highest education level: Not on file   Occupational History    Not on file   Tobacco Use    Smoking status: Never Smoker    Smokeless tobacco: Never Used   Substance and Sexual Activity    Alcohol use: Yes     Alcohol/week: 1.0 standard drink     Types: 1 Glasses of wine per week     Comment: nightly    Drug use: Not on file    Sexual activity: Not on file   Other Topics Concern    Not on file   Social History Narrative    Not on file     Social Determinants of Health     Financial Resource Strain:     Difficulty of Paying Living Expenses: Not on file   Food Insecurity:     Worried About Running Out of Food in the Last Year: Not on file    Carrie of Food in the Last Year: Not on file   Transportation Needs:     Lack of Transportation (Medical): Not on file    Lack of Transportation (Non-Medical): Not on file   Physical Activity:     Days of Exercise per Week: Not on file    Minutes of Exercise per Session: Not on file   Stress:     Feeling of Stress : Not on file   Social Connections:     Frequency of Communication with Friends and Family: Not on file    Frequency of Social Gatherings with Friends and Family: Not on file    Attends Faith Services: Not on file    Active Member of [address] [place] [address] or Organizations: Not on file    Attends Club or Organization Meetings: Not on file    Marital Status: Not on file   Intimate Partner Violence:     Fear of Current or Ex-Partner: Not on file    Emotionally Abused: Not on file    Physically Abused: Not on file    Sexually Abused: Not on file   Housing Stability:     Unable to Pay for Housing in the Last Year: Not on file    Number of [place] in the Last Year: Not on file    Unstable Housing in the Last Year: Not on file     No family history on file. Review of Systems   Constitutional: Negative for activity change, diaphoresis and unexpected weight change. HENT: Negative for congestion, ear discharge, hearing loss and trouble swallowing. Eyes: Negative for photophobia and visual disturbance. Respiratory: Negative for chest tightness and shortness of breath.     Cardiovascular: Positive for palpitations. Negative for chest pain. Gastrointestinal: Negative for abdominal pain, constipation and nausea. Endocrine: Negative for cold intolerance, heat intolerance and polyuria. Genitourinary: Negative for dysuria, flank pain and hematuria. Musculoskeletal: Positive for arthralgias. Negative for gait problem. Skin: Negative for color change. Allergic/Immunologic: Negative for immunocompromised state. Neurological: Negative for tremors, seizures, weakness and numbness. Psychiatric/Behavioral: Negative for decreased concentration and sleep disturbance. The patient is not nervous/anxious. Objective:   Physical Exam  Vitals:    07/06/22 0837   BP: 130/65   Pulse: 55   Resp: 18         General: elderly healthy female,   Awake, alert and oriented. Appears to be not in any distress  Mucous Membranes:  Pink , anicteric  No Submandibular LN palpable  TM normal   Neck: No JVD, no carotid bruit, no thyromegaly  Chest:  Clear to auscultation bilaterally, no added sounds  Cardiovascular:  RRR S1S2 heard, no murmurs or gallops  Abdomen:  Soft, undistended, non tender, no organomegaly, BS present  Extremities: No edema or cyanosis. Distal pulses well felt  IP arthritis of both hands noted  Neurological : grossly normal  CN 2 to 12 intact normal           ECHO 7/21     Normal left ventricle systolic function with an estimated ejection fraction    of 55%.    No regional wall motion abnormalities are seen.    Normal left ventricular diastolic filling pressure.    Mild mitral regurgitation.    Trace tricuspid regurgitation.    Systolic pulmonary artery pressure (SPAP) is normal and estimated at 20 mmHg    (right atrial pressure 3 mmHg). Wt Readings from Last 3 Encounters:   07/06/22 185 lb (83.9 kg)   01/25/22 190 lb (86.2 kg)   06/30/21 184 lb (83.5 kg)       Assessment:       Diagnosis Orders   1. Routine general medical examination at a health care facility     2.  Elevated fasting glucose     3. Mixed hyperlipidemia     4. Arthritis of left hip     5. Heart palpitations             Plan:         Heart palpitations - intermittent sinus tach on holter  - now on BB low  Dose, toprol 37.5 mg daily helping her symptoms  Continue mag supplements    Hyperlipidemia -was on lipitor but stopped  Check lipids remain stable.  HDL remains low      On harmone supplements and  on aldactone 100 mg daily by GYN  Low salt diet        Abn mammo - MRI neg for any masses    Hip OA - s/p bilateral hip replacements      KIERSTEN screen done and did not need sleep machine    Had colonoscopy 2015   Had mammo last year at [facility] - repeat needed   Increase activity and lose weight      Refused covid vaccine   Had dexa by GYN  Need tio richardson MD

## 2022-07-25 RX ORDER — GABAPENTIN 300 MG/1
CAPSULE ORAL
Qty: 60 CAPSULE | Refills: 0 | Status: SHIPPED | OUTPATIENT
Start: 2022-07-25 | End: 2022-08-22

## 2022-08-22 RX ORDER — GABAPENTIN 300 MG/1
CAPSULE ORAL
Qty: 60 CAPSULE | Refills: 2 | Status: SHIPPED | OUTPATIENT
Start: 2022-08-24 | End: 2022-09-23

## 2022-09-23 RX ORDER — METOPROLOL TARTRATE 37.5 MG/1
TABLET, FILM COATED ORAL
Qty: 90 TABLET | Refills: 1 | Status: SHIPPED | OUTPATIENT
Start: 2022-09-23

## 2022-11-04 ENCOUNTER — TELEPHONE (OUTPATIENT)
Dept: INTERNAL MEDICINE CLINIC | Age: 67
End: 2022-11-04

## 2022-11-04 DIAGNOSIS — J06.9 UPPER RESPIRATORY TRACT INFECTION, UNSPECIFIED TYPE: Primary | ICD-10-CM

## 2022-11-04 RX ORDER — AZITHROMYCIN 250 MG/1
TABLET, FILM COATED ORAL
Qty: 1 PACKET | Refills: 0 | Status: SHIPPED | OUTPATIENT
Start: 2022-11-04

## 2022-11-04 NOTE — TELEPHONE ENCOUNTER
----- Message from Oj Nolasco MD sent at 11/4/2022  3:28 PM EDT -----  Contact: Gerhard [phone]  Start on z pack if no allergies  Also try mucinex   See us if not better    ----- Message -----  From: Janna Cannon  Sent: 11/4/2022   8:39 AM EDT  To: Oj Nolasco MD    Patient has sinus infection. Symptoms are green/yellow mucus, sore throat from drainage, cough at night from drainage. She is asking for antibiotics.      Bernadine Delatorre     Thank you

## 2022-11-04 NOTE — TELEPHONE ENCOUNTER
----- Message from Damaris Navarro MD sent at 11/4/2022  3:28 PM EDT -----  Contact: Carlyle Lewis [phone]  Start on z pack if no allergies  Also try mucinex   See us if not better    ----- Message -----  From: Bharath Sanchez  Sent: 11/4/2022   8:39 AM EDT  To: Damaris Navarro MD    Patient has sinus infection. Symptoms are green/yellow mucus, sore throat from drainage, cough at night from drainage. She is asking for antibiotics.      Casey Ball     Thank you

## 2022-11-22 RX ORDER — GABAPENTIN 300 MG/1
CAPSULE ORAL
Qty: 60 CAPSULE | Refills: 2 | Status: SHIPPED | OUTPATIENT
Start: 2022-11-23 | End: 2022-12-23

## 2023-02-14 ENCOUNTER — OFFICE VISIT (OUTPATIENT)
Dept: INTERNAL MEDICINE CLINIC | Age: 68
End: 2023-02-14

## 2023-02-14 VITALS
SYSTOLIC BLOOD PRESSURE: 138 MMHG | HEIGHT: 65 IN | RESPIRATION RATE: 18 BRPM | BODY MASS INDEX: 29.16 KG/M2 | HEART RATE: 60 BPM | DIASTOLIC BLOOD PRESSURE: 75 MMHG | WEIGHT: 175 LBS

## 2023-02-14 DIAGNOSIS — R73.01 ELEVATED FASTING GLUCOSE: Primary | ICD-10-CM

## 2023-02-14 DIAGNOSIS — R30.0 DYSURIA: ICD-10-CM

## 2023-02-14 DIAGNOSIS — E78.2 MIXED HYPERLIPIDEMIA: ICD-10-CM

## 2023-02-14 PROCEDURE — 99212 OFFICE O/P EST SF 10 MIN: CPT | Performed by: INTERNAL MEDICINE

## 2023-02-14 PROCEDURE — 1123F ACP DISCUSS/DSCN MKR DOCD: CPT | Performed by: INTERNAL MEDICINE

## 2023-02-14 PROCEDURE — 81002 URINALYSIS NONAUTO W/O SCOPE: CPT | Performed by: INTERNAL MEDICINE

## 2023-02-14 ASSESSMENT — ENCOUNTER SYMPTOMS
CONSTIPATION: 0
TROUBLE SWALLOWING: 0
PHOTOPHOBIA: 0
CHEST TIGHTNESS: 0
SHORTNESS OF BREATH: 0
COLOR CHANGE: 0
NAUSEA: 0
ABDOMINAL PAIN: 0

## 2023-02-14 NOTE — PROGRESS NOTES
Subjective:      Patient ID: Dahlia Angel is a 74 y.o. female. HPI    74 y.o. female with hx of palpitations, hyperlipidemia here for regular f.w     No new issues except for weight loss with diet , lost about 10 lbs      Palpitations , pt had previous holter monitor showing sinus tachycardia going upto 160's with symptoms and started on low dose BB and now increased to 37.5 mg  and tolerating well   No previous cardiac issues or pulm issues  Non smoker     Hyperlipidemia - started on statins but stopped 8  months ago for unspecified reasons      Pt currently on aldactone for chronic pedal edam by GYN    Was on testosterone, estrogen supplements from her GYN. Recently changed it to pellet which is inserted on her hip area    Also on mag supplements, gabapentin by GYN for chronic leg cramps     OA of multiple joints , pt had both hip replacements for OA     No depression issues  No recent falls     Allergies   Allergen Reactions    Other Anaphylaxis     CLAMS    Adhesive Tape     Iodine     Penicillins     Sulfa Antibiotics     Sulfasalazine     Clindamycin/Lincomycin Rash     Current Outpatient Medications   Medication Sig Dispense Refill    gabapentin (NEURONTIN) 300 MG capsule TAKE TWO CAPSULES BY MOUTH AT BEDTIME 60 capsule 2    Metoprolol Tartrate 37.5 MG TABS TAKE ONE TABLET BY MOUTH DAILY 90 tablet 1    magnesium oxide (MAG-OX) 400 MG tablet Take 800 mg by mouth nightly      magnesium 30 MG tablet Take 400 mg by mouth nightly       Cholecalciferol 2000 units TABS Take 6,000 Units by mouth daily      spironolactone (ALDACTONE) 50 MG tablet Take 100 mg by mouth daily   1    fluticasone (FLONASE) 50 MCG/ACT nasal spray 2 sprays by Nasal route daily 1 Bottle 5    cetirizine (ZYRTEC) 5 MG tablet Take 5 mg by mouth daily      vitamin B-12 (CYANOCOBALAMIN) 100 MCG tablet Take 50 mcg by mouth daily       No current facility-administered medications for this visit.          Review of Systems   Constitutional: Negative for activity change, diaphoresis and unexpected weight change. HENT:  Negative for congestion, ear discharge, hearing loss and trouble swallowing. Eyes:  Negative for photophobia and visual disturbance. Respiratory:  Negative for chest tightness and shortness of breath. Cardiovascular:  Positive for palpitations. Negative for chest pain. Gastrointestinal:  Negative for abdominal pain, constipation and nausea. Endocrine: Negative for cold intolerance, heat intolerance and polyuria. Genitourinary:  Negative for dysuria, flank pain and hematuria. Musculoskeletal:  Positive for arthralgias. Negative for gait problem. Skin:  Negative for color change. Allergic/Immunologic: Negative for immunocompromised state. Neurological:  Negative for tremors, seizures, weakness and numbness. Psychiatric/Behavioral:  Negative for decreased concentration and sleep disturbance. The patient is not nervous/anxious. Objective:   Physical Exam  Vitals:    02/14/23 1126   BP: 138/75   Pulse: 60   Resp: 18           General: elderly healthy female,   Awake, alert and oriented. Appears to be not in any distress  Mucous Membranes:  Pink , anicteric  No Submandibular LN palpable  Neck: No JVD, no carotid bruit, no thyromegaly  Chest:  Clear to auscultation bilaterally, no added sounds  Cardiovascular:  RRR S1S2 heard, no murmurs or gallops  Abdomen:  Soft, undistended, non tender, no organomegaly, BS present  Extremities: No edema or cyanosis. Distal pulses well felt  IP arthritis of both hands noted  Neurological : grossly normal          ECHO 7/21     Normal left ventricle systolic function with an estimated ejection fraction    of 55%. No regional wall motion abnormalities are seen. Normal left ventricular diastolic filling pressure. Mild mitral regurgitation. Trace tricuspid regurgitation.     Systolic pulmonary artery pressure (SPAP) is normal and estimated at 20 mmHg    (right atrial pressure 3 mmHg). Wt Readings from Last 3 Encounters:   02/14/23 175 lb (79.4 kg)   07/06/22 185 lb (83.9 kg)   01/25/22 190 lb (86.2 kg)       Assessment:       Diagnosis Orders   1. Elevated fasting glucose  Hemoglobin A1C      2. Mixed hyperlipidemia  CBC with Auto Differential    Comprehensive Metabolic Panel    Lipid, Fasting      3. Dysuria  POCT Urinalysis no Micro              Plan:         Heart palpitations - intermittent sinus tach on holter  - now on BB low  Dose, toprol 37.5 mg daily helping her symptoms  Continue mag supplements    Hyperlipidemia -was on lipitor but stopped last year, recommend to resume  Check lipids today .  HDL remains low    Chronic leg cramps - on mag , gabapentin     On harmone supplements and  on aldactone 100 mg daily by GYN  Low salt diet      Abn mammo - done last year at [facility]    Hip OA - s/p bilateral hip replacements      KIERSTEN screen done and did not need sleep machine    Had colonoscopy 2015   Had mammo needed this year   Increase activity and lose weight      Refused covid vaccine   Had dexa by GYN  Rock Lorenz MD

## 2023-02-21 RX ORDER — GABAPENTIN 300 MG/1
CAPSULE ORAL
Qty: 60 CAPSULE | Refills: 2 | Status: SHIPPED | OUTPATIENT
Start: 2023-02-21 | End: 2023-03-23

## 2023-03-24 ENCOUNTER — TELEPHONE (OUTPATIENT)
Dept: INTERNAL MEDICINE CLINIC | Age: 68
End: 2023-03-24

## 2023-03-24 RX ORDER — AMOXICILLIN 500 MG/1
CAPSULE ORAL
Qty: 4 CAPSULE | Refills: 0 | Status: SHIPPED | OUTPATIENT
Start: 2023-03-24

## 2023-03-24 NOTE — TELEPHONE ENCOUNTER
----- Message from Wally Haor MD sent at 3/24/2023  2:58 PM EDT -----  Contact: [phone]  Amoxicillin 500 mg take 4 pills 1 hour before procedure # 4   ----- Message -----  From: Eric Davenport  Sent: 3/24/2023   1:17 PM EDT  To: MD Dr. Alanis Puckett Sample pt-  Please advise.  ----- Message -----  From: Killian Junior  Sent: 3/24/2023  12:09 PM EDT  To: Eric Davenport, Melquiades White MD    Patient states she is out of medication and uses it for dental procedures.  Requesting refill      amoxicillin (AMOXIL)     Amos Nicole

## 2023-03-24 NOTE — TELEPHONE ENCOUNTER
----- Message from Niraj Denny sent at 3/24/2023  4:57 PM EDT -----  Contact: [phone]  Per Dr. Lennie Goldberg- okay to send  ----- Message -----  From: Niraj Denny  Sent: 3/24/2023   3:25 PM EDT  To: Citlali Lombardi MD    Allergy to penicillins. Okay to send. ----- Message -----  From: Citlali Lombardi MD  Sent: 3/24/2023   2:58 PM EDT  To: Niraj Denny    Amoxicillin 500 mg take 4 pills 1 hour before procedure # 4   ----- Message -----  From: Niraj Denny  Sent: 3/24/2023   1:17 PM EDT  To: MD Dr. Angelic Magana pt-  Please advise.  ----- Message -----  From: Yehuda Burnette  Sent: 3/24/2023  12:09 PM EDT  To: Niraj Denny, Lanny Patel MD    Patient states she is out of medication and uses it for dental procedures.  Requesting refill      amoxicillin (AMOXIL)     Tiajuana Hatchet

## 2023-03-24 NOTE — TELEPHONE ENCOUNTER
I have reviewed discharge instructions with the patient. The patient verbalized understanding. Discharge medications reviewed with patient and appropriate educational materials and side effects teaching were provided. Patient armband removed and shredded Pt informed.

## 2023-05-22 RX ORDER — GABAPENTIN 300 MG/1
CAPSULE ORAL
Qty: 60 CAPSULE | Refills: 2 | Status: SHIPPED | OUTPATIENT
Start: 2023-05-22 | End: 2023-06-21

## 2023-06-19 ENCOUNTER — TELEPHONE (OUTPATIENT)
Dept: INTERNAL MEDICINE CLINIC | Age: 68
End: 2023-06-19

## 2023-06-19 RX ORDER — AMOXICILLIN 500 MG/1
500 CAPSULE ORAL 3 TIMES DAILY
Qty: 15 CAPSULE | Refills: 0 | Status: SHIPPED | OUTPATIENT
Start: 2023-06-19 | End: 2023-06-24

## 2023-06-19 NOTE — TELEPHONE ENCOUNTER
----- Message from [facility] sent at 6/19/2023  3:53 PM EDT -----  Contact: Pt [phone]    ----- Message -----  From: Merary Bennett MD  Sent: 6/19/2023   3:51 PM EDT  To: Chelsea Ferguson    Amox 500 mg tid  x 5 days. Start day before dental procedure    ----- Message -----  From: Karly Bynum  Sent: 6/19/2023  11:07 AM EDT  To: Merary Bennett MD    Pt is requesting a prescription of amoxicillin (AMOXIL) 500 MG capsule due to having a dental procedure on June 27th at [address] [place] [address]. Please advise.                     [address], [address]  Phone: [phone]

## 2023-08-03 ENCOUNTER — OFFICE VISIT (OUTPATIENT)
Dept: INTERNAL MEDICINE CLINIC | Age: 68
End: 2023-08-03

## 2023-08-03 VITALS
DIASTOLIC BLOOD PRESSURE: 72 MMHG | SYSTOLIC BLOOD PRESSURE: 120 MMHG | BODY MASS INDEX: 29.82 KG/M2 | HEART RATE: 62 BPM | HEIGHT: 65 IN | WEIGHT: 179 LBS | RESPIRATION RATE: 18 BRPM

## 2023-08-03 DIAGNOSIS — E78.2 MIXED HYPERLIPIDEMIA: ICD-10-CM

## 2023-08-03 DIAGNOSIS — Z00.00 ANNUAL PHYSICAL EXAM: ICD-10-CM

## 2023-08-03 DIAGNOSIS — R73.01 ELEVATED FASTING GLUCOSE: ICD-10-CM

## 2023-08-03 DIAGNOSIS — R00.2 HEART PALPITATIONS: ICD-10-CM

## 2023-08-03 DIAGNOSIS — Z00.00 ANNUAL PHYSICAL EXAM: Primary | ICD-10-CM

## 2023-08-03 DIAGNOSIS — M16.12 ARTHRITIS OF LEFT HIP: ICD-10-CM

## 2023-08-03 LAB
25(OH)D3 SERPL-MCNC: 58.2 NG/ML
ALBUMIN SERPL-MCNC: 4.4 G/DL (ref 3.4–5)
ALBUMIN/GLOB SERPL: 2 {RATIO} (ref 1.1–2.2)
ALP SERPL-CCNC: 60 U/L (ref 40–129)
ALT SERPL-CCNC: 8 U/L (ref 10–40)
ANION GAP SERPL CALCULATED.3IONS-SCNC: 10 MMOL/L (ref 3–16)
AST SERPL-CCNC: 13 U/L (ref 15–37)
BASOPHILS # BLD: 0.1 K/UL (ref 0–0.2)
BASOPHILS NFR BLD: 0.9 %
BILIRUB SERPL-MCNC: 0.5 MG/DL (ref 0–1)
BILIRUBIN, POC: NORMAL
BLOOD URINE, POC: NORMAL
BUN SERPL-MCNC: 11 MG/DL (ref 7–20)
CALCIUM SERPL-MCNC: 9.4 MG/DL (ref 8.3–10.6)
CHLORIDE SERPL-SCNC: 103 MMOL/L (ref 99–110)
CHOLEST SERPL-MCNC: 171 MG/DL (ref 0–199)
CLARITY, POC: NORMAL
CO2 SERPL-SCNC: 26 MMOL/L (ref 21–32)
COLOR, POC: NORMAL
CREAT SERPL-MCNC: 0.7 MG/DL (ref 0.6–1.2)
DEPRECATED RDW RBC AUTO: 14.6 % (ref 12.4–15.4)
EOSINOPHIL # BLD: 0.2 K/UL (ref 0–0.6)
EOSINOPHIL NFR BLD: 3.6 %
GFR SERPLBLD CREATININE-BSD FMLA CKD-EPI: >60 ML/MIN/{1.73_M2}
GLUCOSE SERPL-MCNC: 104 MG/DL (ref 70–99)
GLUCOSE URINE, POC: NORMAL
HCT VFR BLD AUTO: 44.3 % (ref 36–48)
HDLC SERPL-MCNC: 49 MG/DL (ref 40–60)
HGB BLD-MCNC: 14.7 G/DL (ref 12–16)
KETONES, POC: NORMAL
LDL CHOLESTEROL CALCULATED: 107 MG/DL
LEUKOCYTE EST, POC: NORMAL
LYMPHOCYTES # BLD: 1.4 K/UL (ref 1–5.1)
LYMPHOCYTES NFR BLD: 21.5 %
MCH RBC QN AUTO: 31.6 PG (ref 26–34)
MCHC RBC AUTO-ENTMCNC: 33.2 G/DL (ref 31–36)
MCV RBC AUTO: 95.3 FL (ref 80–100)
MONOCYTES # BLD: 0.6 K/UL (ref 0–1.3)
MONOCYTES NFR BLD: 9.4 %
NEUTROPHILS # BLD: 4.1 K/UL (ref 1.7–7.7)
NEUTROPHILS NFR BLD: 64.6 %
NITRITE, POC: NORMAL
PH, POC: NORMAL
PLATELET # BLD AUTO: 357 K/UL (ref 135–450)
PMV BLD AUTO: 8.4 FL (ref 5–10.5)
POTASSIUM SERPL-SCNC: 4.6 MMOL/L (ref 3.5–5.1)
PROT SERPL-MCNC: 6.6 G/DL (ref 6.4–8.2)
PROTEIN, POC: NORMAL
RBC # BLD AUTO: 4.65 M/UL (ref 4–5.2)
SODIUM SERPL-SCNC: 139 MMOL/L (ref 136–145)
SPECIFIC GRAVITY, POC: NORMAL
TRIGL SERPL-MCNC: 74 MG/DL (ref 0–150)
TSH SERPL DL<=0.005 MIU/L-ACNC: 3.02 UIU/ML (ref 0.27–4.2)
URATE SERPL-MCNC: 5.3 MG/DL (ref 2.6–6)
UROBILINOGEN, POC: NORMAL
VLDLC SERPL CALC-MCNC: 15 MG/DL
WBC # BLD AUTO: 6.4 K/UL (ref 4–11)

## 2023-08-03 PROCEDURE — 99397 PER PM REEVAL EST PAT 65+ YR: CPT | Performed by: INTERNAL MEDICINE

## 2023-08-03 PROCEDURE — 81002 URINALYSIS NONAUTO W/O SCOPE: CPT | Performed by: INTERNAL MEDICINE

## 2023-08-03 ASSESSMENT — PATIENT HEALTH QUESTIONNAIRE - PHQ9
SUM OF ALL RESPONSES TO PHQ QUESTIONS 1-9: 0
1. LITTLE INTEREST OR PLEASURE IN DOING THINGS: 0
2. FEELING DOWN, DEPRESSED OR HOPELESS: 0
SUM OF ALL RESPONSES TO PHQ QUESTIONS 1-9: 0
2. FEELING DOWN, DEPRESSED OR HOPELESS: 0
SUM OF ALL RESPONSES TO PHQ QUESTIONS 1-9: 0
SUM OF ALL RESPONSES TO PHQ9 QUESTIONS 1 & 2: 0
SUM OF ALL RESPONSES TO PHQ9 QUESTIONS 1 & 2: 0
1. LITTLE INTEREST OR PLEASURE IN DOING THINGS: 0
SUM OF ALL RESPONSES TO PHQ QUESTIONS 1-9: 0

## 2023-08-03 ASSESSMENT — ENCOUNTER SYMPTOMS
CONSTIPATION: 0
NAUSEA: 0
PHOTOPHOBIA: 0
TROUBLE SWALLOWING: 0
SHORTNESS OF BREATH: 0
COLOR CHANGE: 0
ABDOMINAL PAIN: 1
CHEST TIGHTNESS: 0

## 2023-08-03 NOTE — PROGRESS NOTES
Subjective:      Patient ID: Sedrick Rae is a 74 y.o. female. HPI    74 y.o. female with hx of palpitations, hyperlipidemia here for annual exam     No new issues except for weight gain agagin      Palpitations , pt had previous holter monitor showing sinus tachycardia going upto 160's with symptoms and started on low dose BB and now increased to 37.5 mg  and tolerating well except for occasional dizziness  No previous cardiac issues or pulm issues  Non smoker     Hyperlipidemia - started on statins but stopped last year  for unspecified reasons      Pt currently on aldactone for chronic pedal edam by GYN    Was on testosterone, estrogen supplements from her GYN. Recently changed it to pellet which is inserted on her hip area    Also on mag supplements, gabapentin by GYN for chronic leg cramps     OA of multiple joints , pt had both hip replacements for OA     No depression issues  No recent falls     Allergies   Allergen Reactions    Other Anaphylaxis     CLAMS    Adhesive Tape     Iodine     Penicillins     Sulfa Antibiotics     Sulfasalazine     Clindamycin/Lincomycin Rash     Past Medical History:   Diagnosis Date    Allergic rhinitis     KIERSTEN (obstructive sleep apnea)     PONV (postoperative nausea and vomiting)      Past Surgical History:   Procedure Laterality Date    BREAST RECONSTRUCTION      benign tumors    COLONOSCOPY  10/09/2015    HERNIA REPAIR      JOINT REPLACEMENT      left - 6/2019, right 12/2019    KNEE SURGERY      NOSE SURGERY      TONSILLECTOMY       Social History     Socioeconomic History    Marital status: Single     Spouse name: Not on file    Number of children: Not on file    Years of education: Not on file    Highest education level: Not on file   Occupational History    Not on file   Tobacco Use    Smoking status: Never    Smokeless tobacco: Never   Substance and Sexual Activity    Alcohol use:  Yes     Alcohol/week: 1.0 standard drink     Types: 1 Glasses of wine per week

## 2023-08-04 LAB
EST. AVERAGE GLUCOSE BLD GHB EST-MCNC: 116.9 MG/DL
HBA1C MFR BLD: 5.7 %

## 2023-08-18 RX ORDER — GABAPENTIN 300 MG/1
CAPSULE ORAL
Qty: 60 CAPSULE | Refills: 2 | Status: SHIPPED | OUTPATIENT
Start: 2023-08-18 | End: 2023-09-17

## 2023-10-09 RX ORDER — METOPROLOL TARTRATE 37.5 MG/1
TABLET, FILM COATED ORAL
Qty: 90 TABLET | Refills: 1 | Status: SHIPPED | OUTPATIENT
Start: 2023-10-09

## 2023-11-14 RX ORDER — GABAPENTIN 300 MG/1
CAPSULE ORAL
Qty: 60 CAPSULE | Refills: 0 | Status: SHIPPED | OUTPATIENT
Start: 2023-11-16 | End: 2023-12-16

## 2023-12-12 RX ORDER — GABAPENTIN 300 MG/1
CAPSULE ORAL
Qty: 60 CAPSULE | Refills: 0 | Status: SHIPPED | OUTPATIENT
Start: 2023-12-14 | End: 2024-01-13

## 2024-01-09 ENCOUNTER — TELEPHONE (OUTPATIENT)
Dept: INTERNAL MEDICINE CLINIC | Age: 69
End: 2024-01-09

## 2024-01-09 RX ORDER — GABAPENTIN 300 MG/1
CAPSULE ORAL
Qty: 60 CAPSULE | Refills: 0 | Status: SHIPPED | OUTPATIENT
Start: 2024-01-13 | End: 2024-02-12

## 2024-01-09 RX ORDER — AMOXICILLIN 500 MG/1
500 CAPSULE ORAL 3 TIMES DAILY
Qty: 15 CAPSULE | Refills: 0 | Status: SHIPPED | OUTPATIENT
Start: 2024-01-09 | End: 2024-01-14

## 2024-01-09 NOTE — TELEPHONE ENCOUNTER
----- Message from Deniz Bain MD sent at 1/9/2024  3:33 PM EST -----  Contact: [phone]  I believe she can take  Call her    ----- Message -----  From: Ruma Ferguson  Sent: 1/9/2024   1:50 PM EST  To: Deniz Bain MD    Allergy to PCN, Sulfa and Clindamycin   ----- Message -----  From: Deniz Bain MD  Sent: 1/9/2024   1:00 PM EST  To: Ruma Thomasmelissa    Amox 500 mg bid x 5 days    ----- Message -----  From: Ella Hutchinson MA  Sent: 1/9/2024   8:25 AM EST  To: Deniz Bain MD    Patient called and states that she has had sinus pressure and nasal congestion since Saturday, 1/6. She has been taking Mucinex over the counter and it isn't helping knock it out. She wants to know if you can call in amoxicillin.         Pharmacy:     [facility] [place]           Vital Signs Last 24 Hrs  T(C): 37.4 (25 Jan 2018 21:12), Max: 38.4 (25 Jan 2018 16:00)  T(F): 99.4 (25 Jan 2018 21:12), Max: 101.2 (25 Jan 2018 16:00)  HR: 88 (25 Jan 2018 21:12) (67 - 88)  BP: 124/91 (25 Jan 2018 21:12) (124/91 - 167/78)  BP(mean): --  RR: 22 (25 Jan 2018 21:12) (18 - 27)  SpO2: 100% (25 Jan 2018 21:12) (89% - 100%)    PHYSICAL EXAM:  General: NAD, well-developed  Eyes: EOMI.   Neck: Supple, No JVD  Chest/Lung: Clear to auscultation bilaterally; No wheezes. BS distant.   Heart: Regular rate and rhythm; No murmurs, rubs, or gallops.  Abdom: Soft, Nontender, Nondistended; Bowel sounds present. (+) large midline scar.   Extremities: No lower extremity edema.   Psych: AAOx1  Neurology: not able to assess str given lethargy. (+) dysarthria   Skin: No rashes or lesions Vital Signs Last 24 Hrs  T(C): 37.4 (25 Jan 2018 21:12), Max: 38.4 (25 Jan 2018 16:00)  T(F): 99.4 (25 Jan 2018 21:12), Max: 101.2 (25 Jan 2018 16:00)  HR: 88 (25 Jan 2018 21:12) (67 - 88)  BP: 124/91 (25 Jan 2018 21:12) (124/91 - 167/78)  BP(mean): --  RR: 22 (25 Jan 2018 21:12) (18 - 27)  SpO2: 100% (25 Jan 2018 21:12) (89% - 100%)    PHYSICAL EXAM:  General: NAD, well-developed  Eyes: EOMI.   Neck: Supple, No JVD  Chest/Lung: Clear to auscultation bilaterally; No wheezes. BS distant.   Heart: Regular rate and rhythm; 3/6 sys murmur  Abdom: Soft, Nontender, Nondistended; Bowel sounds present. (+) large midline scar.   Extremities: No lower extremity edema.   Psych: AAOx1  Neurology: not able to assess str given lethargy. (+) dysarthria   Skin: No rashes or lesions Vital Signs Last 24 Hrs  T(C): 37.4 (25 Jan 2018 21:12), Max: 38.4 (25 Jan 2018 16:00)  T(F): 99.4 (25 Jan 2018 21:12), Max: 101.2 (25 Jan 2018 16:00)  HR: 88 (25 Jan 2018 21:12) (67 - 88)  BP: 124/91 (25 Jan 2018 21:12) (124/91 - 167/78)  BP(mean): --  RR: 22 (25 Jan 2018 21:12) (18 - 27)  SpO2: 100% (25 Jan 2018 21:12) (89% - 100%)    PHYSICAL EXAM:  General: NAD, well-developed  Eyes: EOMI no tearing or conjunctival redness  Neck: Supple, No JVD  Chest/Lung: Clear to auscultation bilaterally; No wheezes. BS distant.   Heart: Regular rate and rhythm; 3/6 sys murmur no sig LE edema  Abdom: Soft, Nontender, Nondistended; Bowel sounds present. (+) large midline scar.   Extremities: ROM intact, no clubbing  Psych: AAOx1  Neurology: not able to assess str given lethargy. (+) dysarthria   Skin: No rashes or lesions

## 2024-02-12 RX ORDER — GABAPENTIN 300 MG/1
CAPSULE ORAL
Qty: 60 CAPSULE | Refills: 0 | Status: SHIPPED | OUTPATIENT
Start: 2024-02-12 | End: 2024-03-13

## 2024-03-13 RX ORDER — GABAPENTIN 300 MG/1
CAPSULE ORAL
Qty: 60 CAPSULE | Refills: 0 | Status: SHIPPED | OUTPATIENT
Start: 2024-03-14 | End: 2024-04-13

## 2024-04-05 RX ORDER — METOPROLOL TARTRATE 37.5 MG/1
1 TABLET, FILM COATED ORAL DAILY
Qty: 90 TABLET | Refills: 0 | Status: SHIPPED | OUTPATIENT
Start: 2024-04-05

## 2024-04-11 RX ORDER — GABAPENTIN 300 MG/1
CAPSULE ORAL
Qty: 60 CAPSULE | Refills: 0 | Status: SHIPPED | OUTPATIENT
Start: 2024-04-12 | End: 2024-05-12

## 2024-05-01 ENCOUNTER — TELEPHONE (OUTPATIENT)
Dept: INTERNAL MEDICINE CLINIC | Age: 69
End: 2024-05-01

## 2024-05-01 RX ORDER — AMOXICILLIN 500 MG/1
500 CAPSULE ORAL 4 TIMES DAILY
Qty: 40 CAPSULE | Refills: 0 | Status: SHIPPED | OUTPATIENT
Start: 2024-05-01 | End: 2024-05-11

## 2024-05-01 NOTE — TELEPHONE ENCOUNTER
----- Message from Deniz Bain MD sent at 5/1/2024  2:38 PM EDT -----  Contact: [phone]  Amox 500 mg qid x 10 days    ----- Message -----  From: Eliane Linette  Sent: 5/1/2024   9:37 AM EDT  To: Deniz Bain MD    Pt states she has a appt at the dentist tomorrow and will need a script for   Amoxicillin. Pt states she usually gets 4 tablets. Pt asking if this medication and have refills in case dentist wants to do further work in upcoming weeks. Please advise          Pharmacy    [facility] [id number] - [place], [place] - [address] - P [phone] - F [phone] [address] [place] [place] [address]  Phone: [phone]  Fax: [phone]

## 2024-05-13 RX ORDER — GABAPENTIN 300 MG/1
CAPSULE ORAL
Qty: 60 CAPSULE | Refills: 0 | Status: SHIPPED | OUTPATIENT
Start: 2024-05-13 | End: 2024-06-12

## 2024-06-02 SDOH — ECONOMIC STABILITY: TRANSPORTATION INSECURITY
IN THE PAST 12 MONTHS, HAS LACK OF TRANSPORTATION KEPT YOU FROM MEETINGS, WORK, OR FROM GETTING THINGS NEEDED FOR DAILY LIVING?: NO

## 2024-06-02 SDOH — ECONOMIC STABILITY: INCOME INSECURITY: HOW HARD IS IT FOR YOU TO PAY FOR THE VERY BASICS LIKE FOOD, HOUSING, MEDICAL CARE, AND HEATING?: NOT VERY HARD

## 2024-06-02 SDOH — ECONOMIC STABILITY: FOOD INSECURITY: WITHIN THE PAST 12 MONTHS, YOU WORRIED THAT YOUR FOOD WOULD RUN OUT BEFORE YOU GOT MONEY TO BUY MORE.: NEVER TRUE

## 2024-06-02 SDOH — ECONOMIC STABILITY: FOOD INSECURITY: WITHIN THE PAST 12 MONTHS, THE FOOD YOU BOUGHT JUST DIDN'T LAST AND YOU DIDN'T HAVE MONEY TO GET MORE.: NEVER TRUE

## 2024-06-02 SDOH — HEALTH STABILITY: PHYSICAL HEALTH: ON AVERAGE, HOW MANY DAYS PER WEEK DO YOU ENGAGE IN MODERATE TO STRENUOUS EXERCISE (LIKE A BRISK WALK)?: 2 DAYS

## 2024-06-02 SDOH — ECONOMIC STABILITY: HOUSING INSECURITY
IN THE LAST 12 MONTHS, WAS THERE A TIME WHEN YOU DID NOT HAVE A STEADY PLACE TO SLEEP OR SLEPT IN A SHELTER (INCLUDING NOW)?: NO

## 2024-06-02 SDOH — HEALTH STABILITY: PHYSICAL HEALTH: ON AVERAGE, HOW MANY MINUTES DO YOU ENGAGE IN EXERCISE AT THIS LEVEL?: 30 MIN

## 2024-06-02 ASSESSMENT — PATIENT HEALTH QUESTIONNAIRE - PHQ9
1. LITTLE INTEREST OR PLEASURE IN DOING THINGS: NOT AT ALL
2. FEELING DOWN, DEPRESSED OR HOPELESS: NOT AT ALL
SUM OF ALL RESPONSES TO PHQ QUESTIONS 1-9: 0
SUM OF ALL RESPONSES TO PHQ9 QUESTIONS 1 & 2: 0
SUM OF ALL RESPONSES TO PHQ QUESTIONS 1-9: 0

## 2024-06-02 ASSESSMENT — LIFESTYLE VARIABLES
HOW OFTEN DO YOU HAVE A DRINK CONTAINING ALCOHOL: 4
HOW OFTEN DO YOU HAVE A DRINK CONTAINING ALCOHOL: 2-3 TIMES A WEEK
HOW MANY STANDARD DRINKS CONTAINING ALCOHOL DO YOU HAVE ON A TYPICAL DAY: 1 OR 2
HOW OFTEN DO YOU HAVE SIX OR MORE DRINKS ON ONE OCCASION: 1
HOW MANY STANDARD DRINKS CONTAINING ALCOHOL DO YOU HAVE ON A TYPICAL DAY: 1

## 2024-06-04 ENCOUNTER — OFFICE VISIT (OUTPATIENT)
Dept: INTERNAL MEDICINE CLINIC | Age: 69
End: 2024-06-04

## 2024-06-04 VITALS
SYSTOLIC BLOOD PRESSURE: 118 MMHG | HEART RATE: 65 BPM | DIASTOLIC BLOOD PRESSURE: 75 MMHG | HEIGHT: 65 IN | RESPIRATION RATE: 18 BRPM | BODY MASS INDEX: 29.82 KG/M2 | WEIGHT: 179 LBS

## 2024-06-04 DIAGNOSIS — E78.2 MIXED HYPERLIPIDEMIA: ICD-10-CM

## 2024-06-04 DIAGNOSIS — E55.9 VITAMIN D DEFICIENCY: ICD-10-CM

## 2024-06-04 DIAGNOSIS — R60.0 PEDAL EDEMA: ICD-10-CM

## 2024-06-04 DIAGNOSIS — Z00.00 INITIAL MEDICARE ANNUAL WELLNESS VISIT: ICD-10-CM

## 2024-06-04 DIAGNOSIS — R00.2 HEART PALPITATIONS: ICD-10-CM

## 2024-06-04 DIAGNOSIS — Z71.89 ACP (ADVANCE CARE PLANNING): ICD-10-CM

## 2024-06-04 DIAGNOSIS — Z00.00 WELCOME TO MEDICARE PREVENTIVE VISIT: Primary | ICD-10-CM

## 2024-06-04 PROBLEM — M16.12 ARTHRITIS OF LEFT HIP: Status: RESOLVED | Noted: 2019-06-24 | Resolved: 2024-06-04

## 2024-06-04 PROCEDURE — G0402 INITIAL PREVENTIVE EXAM: HCPCS | Performed by: INTERNAL MEDICINE

## 2024-06-04 PROCEDURE — 81002 URINALYSIS NONAUTO W/O SCOPE: CPT | Performed by: INTERNAL MEDICINE

## 2024-06-04 PROCEDURE — 1123F ACP DISCUSS/DSCN MKR DOCD: CPT | Performed by: INTERNAL MEDICINE

## 2024-06-04 ASSESSMENT — PATIENT HEALTH QUESTIONNAIRE - PHQ9
1. LITTLE INTEREST OR PLEASURE IN DOING THINGS: NOT AT ALL
SUM OF ALL RESPONSES TO PHQ QUESTIONS 1-9: 0
SUM OF ALL RESPONSES TO PHQ QUESTIONS 1-9: 0
2. FEELING DOWN, DEPRESSED OR HOPELESS: NOT AT ALL
SUM OF ALL RESPONSES TO PHQ9 QUESTIONS 1 & 2: 0
SUM OF ALL RESPONSES TO PHQ QUESTIONS 1-9: 0
SUM OF ALL RESPONSES TO PHQ QUESTIONS 1-9: 0

## 2024-06-04 NOTE — PROGRESS NOTES
Medicare Annual Wellness Visit    Indu Gan is here for Medicare AWV    Assessment & Plan   Initial Medicare annual wellness visit  -     Uric Acid; Future  Heart palpitations  -     TSH with Reflex to FT4 ([place] Only); Future  Mixed hyperlipidemia  -     Comprehensive Metabolic Panel; Future  -     CBC with Auto Differential; Future  -     Lipid, Fasting; Future  Pedal edema  -     POCT Urinalysis no Micro  Vitamin D deficiency  -     Vitamin D 25 Hydroxy; Future    Recommendations for Preventive Services Due: see orders and patient instructions/AVS.  Recommended screening schedule for the next 5-10 years is provided to the patient in written form: see Patient Instructions/AVS.     Return in about 1 year (around 6/4/2025) for medicare wellness.         Subjective     68 y.o. female with hx of palpitations, hyperlipidemia here for medicare wellness visit    Since last visit, injured right deltoid and now ongoing chiropracter tx     No new issues except for weight gain      Palpitations , pt had previous holter monitor showing sinus tachycardia going upto 160's with symptoms and started on low dose BB and now increased to 37.5 mg  and tolerating well except for occasional dizziness  No previous cardiac issues or pulm issues  Non smoker     Hyperlipidemia - started on statins but stopped last year  for unspecified reasons      Pt currently on aldactone for chronic pedal edam by GYN    Was on testosterone, estrogen supplements from her GYN. Recently changed it to pellet which is inserted on her hip area    Also on mag supplements, gabapentin by GYN for chronic leg cramps     OA of multiple joints , pt had both hip replacements for OA     Lives with , independent of ADl and IADl  No depression issues  No recent falls         Patient's complete Health Risk Assessment and screening values have been reviewed and are found in Flowsheets. The following problems were reviewed today and where indicated

## 2024-06-06 DIAGNOSIS — E78.2 MIXED HYPERLIPIDEMIA: ICD-10-CM

## 2024-06-06 DIAGNOSIS — R00.2 HEART PALPITATIONS: ICD-10-CM

## 2024-06-06 DIAGNOSIS — E55.9 VITAMIN D DEFICIENCY: ICD-10-CM

## 2024-06-06 DIAGNOSIS — Z00.00 INITIAL MEDICARE ANNUAL WELLNESS VISIT: ICD-10-CM

## 2024-06-06 LAB
25(OH)D3 SERPL-MCNC: 76.2 NG/ML
ALBUMIN SERPL-MCNC: 4.1 G/DL (ref 3.4–5)
ALBUMIN/GLOB SERPL: 1.8 {RATIO} (ref 1.1–2.2)
ALP SERPL-CCNC: 71 U/L (ref 40–129)
ALT SERPL-CCNC: 8 U/L (ref 10–40)
ANION GAP SERPL CALCULATED.3IONS-SCNC: 8 MMOL/L (ref 3–16)
AST SERPL-CCNC: 12 U/L (ref 15–37)
BASOPHILS # BLD: 0.1 K/UL (ref 0–0.2)
BASOPHILS NFR BLD: 1 %
BILIRUB SERPL-MCNC: 0.5 MG/DL (ref 0–1)
BUN SERPL-MCNC: 15 MG/DL (ref 7–20)
CALCIUM SERPL-MCNC: 9.3 MG/DL (ref 8.3–10.6)
CHLORIDE SERPL-SCNC: 102 MMOL/L (ref 99–110)
CHOLEST SERPL-MCNC: 181 MG/DL (ref 0–199)
CO2 SERPL-SCNC: 26 MMOL/L (ref 21–32)
CREAT SERPL-MCNC: 0.6 MG/DL (ref 0.6–1.2)
DEPRECATED RDW RBC AUTO: 14 % (ref 12.4–15.4)
EOSINOPHIL # BLD: 0.3 K/UL (ref 0–0.6)
EOSINOPHIL NFR BLD: 5.2 %
GFR SERPLBLD CREATININE-BSD FMLA CKD-EPI: >90 ML/MIN/{1.73_M2}
HCT VFR BLD AUTO: 41.1 % (ref 36–48)
HDLC SERPL-MCNC: 48 MG/DL (ref 40–60)
HGB BLD-MCNC: 14.1 G/DL (ref 12–16)
LDL CHOLESTEROL: 119 MG/DL
LYMPHOCYTES # BLD: 1.3 K/UL (ref 1–5.1)
LYMPHOCYTES NFR BLD: 24.7 %
MCH RBC QN AUTO: 32.7 PG (ref 26–34)
MCHC RBC AUTO-ENTMCNC: 34.4 G/DL (ref 31–36)
MCV RBC AUTO: 95 FL (ref 80–100)
MONOCYTES # BLD: 0.5 K/UL (ref 0–1.3)
MONOCYTES NFR BLD: 9.7 %
NEUTROPHILS # BLD: 3.2 K/UL (ref 1.7–7.7)
NEUTROPHILS NFR BLD: 59.4 %
PLATELET # BLD AUTO: 318 K/UL (ref 135–450)
PMV BLD AUTO: 8.3 FL (ref 5–10.5)
POTASSIUM SERPL-SCNC: 4.6 MMOL/L (ref 3.5–5.1)
PROT SERPL-MCNC: 6.4 G/DL (ref 6.4–8.2)
RBC # BLD AUTO: 4.32 M/UL (ref 4–5.2)
SODIUM SERPL-SCNC: 136 MMOL/L (ref 136–145)
TRIGL SERPL-MCNC: 70 MG/DL (ref 0–150)
TSH SERPL DL<=0.005 MIU/L-ACNC: 2.68 UIU/ML (ref 0.27–4.2)
URATE SERPL-MCNC: 5.9 MG/DL (ref 2.6–6)
VLDLC SERPL CALC-MCNC: 14 MG/DL
WBC # BLD AUTO: 5.5 K/UL (ref 4–11)

## 2024-06-12 RX ORDER — GABAPENTIN 300 MG/1
CAPSULE ORAL
Qty: 60 CAPSULE | Refills: 2 | Status: SHIPPED | OUTPATIENT
Start: 2024-06-12 | End: 2024-07-12

## 2024-07-10 ENCOUNTER — TELEPHONE (OUTPATIENT)
Dept: INTERNAL MEDICINE CLINIC | Age: 69
End: 2024-07-10

## 2024-07-10 RX ORDER — METHYLPREDNISOLONE 4 MG/1
TABLET ORAL
Qty: 1 KIT | Refills: 0 | Status: SHIPPED | OUTPATIENT
Start: 2024-07-10 | End: 2024-07-16

## 2024-07-10 RX ORDER — METOPROLOL TARTRATE 37.5 MG/1
1 TABLET, FILM COATED ORAL DAILY
Qty: 90 TABLET | Refills: 0 | Status: SHIPPED | OUTPATIENT
Start: 2024-07-10

## 2024-07-10 NOTE — TELEPHONE ENCOUNTER
----- Message from Deniz Bain MD sent at 7/10/2024 10:52 AM EDT -----  Contact: [phone]  Medrol dose pack  ----- Message -----  From: Ella Hutchinson MA  Sent: 7/10/2024   9:05 AM EDT  To: Deniz Bain MD    Patient called and states that she got into some poison sumac and it is on her left hip for the last 3 days. She woke up this morning and was stuck to the sheet due to it popping open in the night. It also appears to be around her left eye as well. She is unable to come in for an appointment this week and is hoping to have something called in for her. Please advise.       [facility] [id number] - [place], [place] - [address] - P [phone] - F [phone]

## 2024-09-13 RX ORDER — GABAPENTIN 300 MG/1
CAPSULE ORAL
Qty: 60 CAPSULE | Refills: 2 | Status: SHIPPED | OUTPATIENT
Start: 2024-09-13 | End: 2024-10-13

## 2024-10-09 RX ORDER — METOPROLOL TARTRATE 37.5 MG/1
1 TABLET, FILM COATED ORAL DAILY
Qty: 90 TABLET | Refills: 0 | Status: SHIPPED | OUTPATIENT
Start: 2024-10-09

## 2024-10-30 ENCOUNTER — OFFICE VISIT (OUTPATIENT)
Dept: INTERNAL MEDICINE CLINIC | Age: 69
End: 2024-10-30

## 2024-10-30 ENCOUNTER — HOSPITAL ENCOUNTER (OUTPATIENT)
Age: 69
Discharge: HOME | End: 2024-10-30
Payer: MEDICARE

## 2024-10-30 ENCOUNTER — HOSPITAL ENCOUNTER (OUTPATIENT)
Dept: GENERAL RADIOLOGY | Age: 69
Discharge: HOME | End: 2024-10-30
Payer: MEDICARE

## 2024-10-30 VITALS
HEART RATE: 62 BPM | HEIGHT: 65 IN | BODY MASS INDEX: 29.82 KG/M2 | SYSTOLIC BLOOD PRESSURE: 150 MMHG | DIASTOLIC BLOOD PRESSURE: 70 MMHG | WEIGHT: 179 LBS | RESPIRATION RATE: 14 BRPM

## 2024-10-30 DIAGNOSIS — R10.9 RIGHT FLANK PAIN: Primary | ICD-10-CM

## 2024-10-30 DIAGNOSIS — R30.0 DYSURIA: ICD-10-CM

## 2024-10-30 DIAGNOSIS — R10.9 RIGHT FLANK PAIN: ICD-10-CM

## 2024-10-30 LAB
BILIRUBIN, POC: NORMAL
BLOOD URINE, POC: NORMAL
CLARITY, POC: NORMAL
COLOR, POC: NORMAL
GLUCOSE URINE, POC: NORMAL MG/DL
KETONES, POC: NORMAL MG/DL
LEUKOCYTE EST, POC: NORMAL
NITRITE, POC: NORMAL
PH, POC: NORMAL
PROTEIN, POC: NORMAL MG/DL
SPECIFIC GRAVITY, POC: NORMAL
UROBILINOGEN, POC: NORMAL MG/DL

## 2024-10-30 PROCEDURE — 99212 OFFICE O/P EST SF 10 MIN: CPT | Performed by: INTERNAL MEDICINE

## 2024-10-30 PROCEDURE — 1160F RVW MEDS BY RX/DR IN RCRD: CPT | Performed by: INTERNAL MEDICINE

## 2024-10-30 PROCEDURE — 1159F MED LIST DOCD IN RCRD: CPT | Performed by: INTERNAL MEDICINE

## 2024-10-30 PROCEDURE — 1123F ACP DISCUSS/DSCN MKR DOCD: CPT | Performed by: INTERNAL MEDICINE

## 2024-10-30 PROCEDURE — 81002 URINALYSIS NONAUTO W/O SCOPE: CPT | Performed by: INTERNAL MEDICINE

## 2024-10-30 PROCEDURE — 74018 RADEX ABDOMEN 1 VIEW: CPT

## 2024-10-30 RX ORDER — PROGESTERONE 200 MG/1
200 CAPSULE ORAL DAILY
COMMUNITY
Start: 2024-08-12

## 2024-10-30 NOTE — PROGRESS NOTES
Indu Gan (:  1955) is a 69 y.o. female,Established patient, here for evaluation of the following chief complaint(s):  Flank Pain (/)           Return in about 6 months (around 2025) for medicare wellness.       Subjective   HPI  69 y.o. female here for right upper quadrant and back pain x few days , noted pain is insudious in onset, feels achy and notes cloudy urine. No fever or chills. Discomfort noted in this region. Remote hx of right kidney stone   No falls or trauma or rash     Allergies   Allergen Reactions    Other Anaphylaxis     CLAMS    Adhesive Tape     Iodine     Sulfa Antibiotics     Sulfasalazine     Clindamycin/Lincomycin Rash     Current Outpatient Medications   Medication Sig Dispense Refill    progesterone (PROMETRIUM) 200 MG CAPS capsule Take 1 capsule by mouth daily      Metoprolol Tartrate 37.5 MG TABS TAKE 1 TABLET BY MOUTH DAILY 90 tablet 0    gabapentin (NEURONTIN) 300 MG capsule TAKE TWO CAPSULES BY MOUTH AT BEDTIME 60 capsule 2    magnesium oxide (MAG-OX) 400 MG tablet Take 800 mg by mouth nightly      magnesium 30 MG tablet Take 400 mg by mouth nightly       Cholecalciferol 2000 units TABS Take 6,000 Units by mouth daily      spironolactone (ALDACTONE) 50 MG tablet Take 100 mg by mouth daily   1    fluticasone (FLONASE) 50 MCG/ACT nasal spray 2 sprays by Nasal route daily 1 Bottle 5    cetirizine (ZYRTEC) 5 MG tablet Take 5 mg by mouth daily      vitamin B-12 (CYANOCOBALAMIN) 100 MCG tablet Take 50 mcg by mouth daily       No current facility-administered medications for this visit.         Review of Systems   As above      Objective   Physical Exam  Vitals:    10/30/24 0854   BP: (!) 150/70   Pulse: 62   Resp: 14         General: elderly female, healthy appearing   Awake, alert and oriented. Appears to be not in any distress  Mucous Membranes:  Pink , anicteric  Neck: No JVD, no carotid bruit, no thyromegaly  Chest:  Clear to auscultation bilaterally, no added

## 2024-11-06 ENCOUNTER — TELEPHONE (OUTPATIENT)
Dept: INTERNAL MEDICINE CLINIC | Age: 69
End: 2024-11-06

## 2024-11-06 DIAGNOSIS — K81.0 ACUTE CHOLECYSTITIS: Primary | ICD-10-CM

## 2024-11-06 NOTE — TELEPHONE ENCOUNTER
Per [facility] order CT Abdomen without contrast due to allery to Iodine.      Patient informed and given scheduling's phone number.

## 2024-11-06 NOTE — TELEPHONE ENCOUNTER
----- Message from Dr. Deniz Bain MD sent at 11/6/2024 12:45 PM EST -----  Obtain Ct abd with IV contrast to rule out gallbladder or liver issue    Diagnosis Acute cholecystitis  ----- Message -----  From: Ruma Ferguson  Sent: 11/6/2024  10:23 AM EST  To: Deniz Bain MD    Patient states she is still not feeling any better.  Continues to have the pain in her right side and a low grade fever every night.

## 2024-11-15 ENCOUNTER — HOSPITAL ENCOUNTER (OUTPATIENT)
Dept: CT IMAGING | Age: 69
Discharge: HOME | End: 2024-11-15
Attending: INTERNAL MEDICINE
Payer: COMMERCIAL

## 2024-11-15 DIAGNOSIS — K81.0 ACUTE CHOLECYSTITIS: ICD-10-CM

## 2024-11-15 PROCEDURE — 74150 CT ABDOMEN W/O CONTRAST: CPT

## 2024-11-18 ENCOUNTER — TELEPHONE (OUTPATIENT)
Dept: INTERNAL MEDICINE CLINIC | Age: 69
End: 2024-11-18

## 2024-11-18 NOTE — TELEPHONE ENCOUNTER
----- Message from Dr. Deniz Bain MD sent at 11/18/2024 12:48 PM EST -----  Call her  ----- Message -----  From: Ruma Ferguson  Sent: 11/18/2024   9:40 AM EST  To: Deniz Bain MD    Patient states she is still having pain and fever.  Please advise.

## 2024-11-18 NOTE — TELEPHONE ENCOUNTER
Dr Bain spoke to patient. Patient being prescribed Augmentin 875 bid for 7 days.  If not better after treatment he will refer patient to a specialist.

## 2024-11-26 ENCOUNTER — TELEPHONE (OUTPATIENT)
Dept: INTERNAL MEDICINE CLINIC | Age: 69
End: 2024-11-26

## 2024-11-26 DIAGNOSIS — K82.9 GALLBLADDER PROBLEM: Primary | ICD-10-CM

## 2024-11-26 NOTE — TELEPHONE ENCOUNTER
Patient informed and given contact information for Dr. Brewer. Referral placed. Patient has completed the antibiotic.

## 2024-11-26 NOTE — TELEPHONE ENCOUNTER
----- Message from Dr. Deniz Bain MD sent at 11/26/2024  9:03 AM EST -----  Contact: SARAVANAN [phone]  Complete the course  See Dr. MCCARTNEY for possible gall bladder issue  If they find it abnormal , they can consider surgery  ----- Message -----  From: Willem Rai  Sent: 11/26/2024   8:54 AM EST  To: Deniz Bain MD    Patient states the below medication has improved the pain and discomfort for the right side pain she has been experiencing. Pt states he now is only experiencing the discomfort when she sits and think about it. Please advise     amoxicillin-clavulanate (AUGMENTIN) 875-125 MG per tablet    [facility] [id number] [facility] [address] -  [phone] - F [phone] [address] [place] [address]  Phone: [phone]  Fax: [phone]

## 2024-12-05 ENCOUNTER — INITIAL CONSULT (OUTPATIENT)
Dept: SURGERY | Age: 69
End: 2024-12-05
Payer: MEDICARE

## 2024-12-05 VITALS
HEIGHT: 65 IN | SYSTOLIC BLOOD PRESSURE: 118 MMHG | WEIGHT: 177 LBS | BODY MASS INDEX: 29.49 KG/M2 | DIASTOLIC BLOOD PRESSURE: 70 MMHG

## 2024-12-05 DIAGNOSIS — R10.11 RUQ ABDOMINAL PAIN: Primary | ICD-10-CM

## 2024-12-05 PROCEDURE — 1159F MED LIST DOCD IN RCRD: CPT | Performed by: SURGERY

## 2024-12-05 PROCEDURE — 99203 OFFICE O/P NEW LOW 30 MIN: CPT | Performed by: SURGERY

## 2024-12-05 PROCEDURE — 1123F ACP DISCUSS/DSCN MKR DOCD: CPT | Performed by: SURGERY

## 2024-12-05 NOTE — PROGRESS NOTES
New Patient Consult    [facility]  [facility]  Hood Brewer MD    [address], [address]  [place] [address]  [phone]    Indu Gan   YOB: 1955    Date of Visit:  12/5/2024    Deniz Elam MD    Chief Complaint: Right upper quadrant pain    HPI: Patient presents for evaluation of right upper quadrant pain.  She had a severe episode a couple months ago.  She had a sharp stabbing pain in her right upper quadrant and right flank.  She had some nausea and vomiting related to this.  She states she had fevers for a month and then they stopped as well.  She was treated with antibiotics, which seems to have resolved her problem.  She reports a history of having passed kidney stones previously, but has never been seen for this or hospitalized.    Allergies   Allergen Reactions    Other Anaphylaxis     CLAMS    Adhesive Tape     Iodine     Sulfa Antibiotics     Sulfasalazine     Clindamycin/Lincomycin Rash     Outpatient Medications Marked as Taking for the 12/5/24 encounter (Initial consult) with Hood Brewer MD   Medication Sig Dispense Refill    progesterone (PROMETRIUM) 200 MG CAPS capsule Take 1 capsule by mouth daily      Metoprolol Tartrate 37.5 MG TABS TAKE 1 TABLET BY MOUTH DAILY 90 tablet 0    magnesium oxide (MAG-OX) 400 MG tablet Take 2 tablets by mouth nightly      magnesium 30 MG tablet Take 400 mg by mouth nightly       Cholecalciferol 2000 units TABS Take 3 tablets by mouth daily      spironolactone (ALDACTONE) 50 MG tablet Take 2 tablets by mouth daily  1    fluticasone (FLONASE) 50 MCG/ACT nasal spray 2 sprays by Nasal route daily 1 Bottle 5    cetirizine (ZYRTEC) 5 MG tablet Take 1 tablet by mouth daily      vitamin B-12 (CYANOCOBALAMIN) 100 MCG tablet Take 0.5 tablets by mouth daily         Past Medical History:   Diagnosis Date    Allergic rhinitis     KIERSTEN (obstructive sleep apnea)     PONV (postoperative

## 2024-12-10 ENCOUNTER — HOSPITAL ENCOUNTER (OUTPATIENT)
Dept: ULTRASOUND IMAGING | Age: 69
Discharge: HOME | End: 2024-12-10
Attending: SURGERY
Payer: MEDICARE

## 2024-12-10 DIAGNOSIS — R10.11 RUQ ABDOMINAL PAIN: ICD-10-CM

## 2024-12-10 PROCEDURE — 76705 ECHO EXAM OF ABDOMEN: CPT

## 2024-12-13 RX ORDER — GABAPENTIN 300 MG/1
CAPSULE ORAL
Qty: 60 CAPSULE | Refills: 2 | Status: SHIPPED | OUTPATIENT
Start: 2024-12-16 | End: 2025-01-15

## 2025-01-13 RX ORDER — METOPROLOL TARTRATE 37.5 MG/1
1 TABLET ORAL DAILY
Qty: 90 TABLET | Refills: 1 | Status: SHIPPED | OUTPATIENT
Start: 2025-01-13

## 2025-03-13 RX ORDER — GABAPENTIN 300 MG/1
CAPSULE ORAL
Qty: 60 CAPSULE | Refills: 2 | Status: SHIPPED | OUTPATIENT
Start: 2025-03-15 | End: 2025-04-14

## 2025-06-10 RX ORDER — GABAPENTIN 300 MG/1
CAPSULE ORAL
Qty: 60 CAPSULE | Refills: 2 | Status: SHIPPED | OUTPATIENT
Start: 2025-06-11 | End: 2025-09-09

## 2025-06-10 RX ORDER — GABAPENTIN 300 MG/1
CAPSULE ORAL
Qty: 60 CAPSULE | Refills: 2 | OUTPATIENT
Start: 2025-06-10 | End: 2025-07-10

## 2025-06-10 NOTE — TELEPHONE ENCOUNTER
----- Message from Linette RUBY sent at 6/10/2025  8:33 AM EDT -----  Contact: [phone]  Pt has made future appt and requesting a refill on below medication. Please advise       gabapentin (NEURONTIN) 300 MG capsule     Pharmacy    [facility] [id number] - [facility] [address] - P [phone] - F [phone] [address] [facility] [address]  Phone: [phone]  Fax: [phone]

## 2025-06-18 ENCOUNTER — TELEPHONE (OUTPATIENT)
Dept: INTERNAL MEDICINE CLINIC | Age: 70
End: 2025-06-18

## 2025-06-18 DIAGNOSIS — E78.2 MIXED HYPERLIPIDEMIA: ICD-10-CM

## 2025-06-18 DIAGNOSIS — Z00.00 ROUTINE GENERAL MEDICAL EXAMINATION AT A HEALTH CARE FACILITY: Primary | ICD-10-CM

## 2025-06-18 DIAGNOSIS — E55.9 VITAMIN D DEFICIENCY: ICD-10-CM

## 2025-06-18 PROCEDURE — 81002 URINALYSIS NONAUTO W/O SCOPE: CPT | Performed by: INTERNAL MEDICINE

## 2025-06-18 NOTE — TELEPHONE ENCOUNTER
----- Message from Linette RUBY sent at 6/18/2025  2:05 PM EDT -----  Contact: [phone]    ----- Message -----  From: Deniz Bain MD  Sent: 6/18/2025   1:42 PM EDT  To: Linette Del Rio    Cmp, cbc lipids, tsh, uric acid, vit D , UA  ----- Message -----  From: Linette Del Rio  Sent: 6/18/2025  11:30 AM EDT  To: Deniz Bain MD    Pt asking if you will need fasting blood work at her next appt on July 9th ? Please advise

## 2025-06-25 DIAGNOSIS — E55.9 VITAMIN D DEFICIENCY: ICD-10-CM

## 2025-06-25 DIAGNOSIS — E78.2 MIXED HYPERLIPIDEMIA: ICD-10-CM

## 2025-06-25 DIAGNOSIS — Z00.00 ROUTINE GENERAL MEDICAL EXAMINATION AT A HEALTH CARE FACILITY: ICD-10-CM

## 2025-06-25 LAB
25(OH)D3 SERPL-MCNC: 101 NG/ML
ALBUMIN SERPL-MCNC: 4.4 G/DL (ref 3.4–5)
ALBUMIN/GLOB SERPL: 2.1 {RATIO} (ref 1.1–2.2)
ALP SERPL-CCNC: 68 U/L (ref 40–129)
ALT SERPL-CCNC: 14 U/L (ref 10–40)
ANION GAP SERPL CALCULATED.3IONS-SCNC: 11 MMOL/L (ref 3–16)
AST SERPL-CCNC: 17 U/L (ref 15–37)
BASOPHILS # BLD: 0 K/UL (ref 0–0.2)
BASOPHILS NFR BLD: 0.6 %
BILIRUB SERPL-MCNC: 0.3 MG/DL (ref 0–1)
BUN SERPL-MCNC: 14 MG/DL (ref 7–20)
CALCIUM SERPL-MCNC: 9.5 MG/DL (ref 8.3–10.6)
CHLORIDE SERPL-SCNC: 102 MMOL/L (ref 99–110)
CHOLEST SERPL-MCNC: 182 MG/DL (ref 0–199)
CO2 SERPL-SCNC: 24 MMOL/L (ref 21–32)
CREAT SERPL-MCNC: 0.7 MG/DL (ref 0.6–1.2)
DEPRECATED RDW RBC AUTO: 13.8 % (ref 12.4–15.4)
EOSINOPHIL # BLD: 0.2 K/UL (ref 0–0.6)
EOSINOPHIL NFR BLD: 4.5 %
GFR SERPLBLD CREATININE-BSD FMLA CKD-EPI: >90 ML/MIN/{1.73_M2}
GLUCOSE SERPL-MCNC: 104 MG/DL (ref 70–99)
HCT VFR BLD AUTO: 43.2 % (ref 36–48)
HDLC SERPL-MCNC: 49 MG/DL (ref 40–60)
HGB BLD-MCNC: 14.7 G/DL (ref 12–16)
LDLC SERPL CALC-MCNC: 121 MG/DL
LYMPHOCYTES # BLD: 1.3 K/UL (ref 1–5.1)
LYMPHOCYTES NFR BLD: 26.5 %
MCH RBC QN AUTO: 32.2 PG (ref 26–34)
MCHC RBC AUTO-ENTMCNC: 34 G/DL (ref 31–36)
MCV RBC AUTO: 94.7 FL (ref 80–100)
MONOCYTES # BLD: 0.4 K/UL (ref 0–1.3)
MONOCYTES NFR BLD: 8.7 %
NEUTROPHILS # BLD: 2.9 K/UL (ref 1.7–7.7)
NEUTROPHILS NFR BLD: 59.7 %
PLATELET # BLD AUTO: 329 K/UL (ref 135–450)
PMV BLD AUTO: 8.6 FL (ref 5–10.5)
POTASSIUM SERPL-SCNC: 4.5 MMOL/L (ref 3.5–5.1)
PROT SERPL-MCNC: 6.5 G/DL (ref 6.4–8.2)
RBC # BLD AUTO: 4.56 M/UL (ref 4–5.2)
SODIUM SERPL-SCNC: 137 MMOL/L (ref 136–145)
TRIGL SERPL-MCNC: 59 MG/DL (ref 0–150)
TSH SERPL DL<=0.005 MIU/L-ACNC: 2.29 UIU/ML (ref 0.27–4.2)
URATE SERPL-MCNC: 5.4 MG/DL (ref 2.6–6)
VLDLC SERPL CALC-MCNC: 12 MG/DL
WBC # BLD AUTO: 4.9 K/UL (ref 4–11)

## 2025-06-26 ENCOUNTER — RESULTS FOLLOW-UP (OUTPATIENT)
Dept: INTERNAL MEDICINE CLINIC | Age: 70
End: 2025-06-26

## 2025-07-07 SDOH — ECONOMIC STABILITY: FOOD INSECURITY: WITHIN THE PAST 12 MONTHS, THE FOOD YOU BOUGHT JUST DIDN'T LAST AND YOU DIDN'T HAVE MONEY TO GET MORE.: NEVER TRUE

## 2025-07-07 SDOH — ECONOMIC STABILITY: FOOD INSECURITY: WITHIN THE PAST 12 MONTHS, YOU WORRIED THAT YOUR FOOD WOULD RUN OUT BEFORE YOU GOT MONEY TO BUY MORE.: NEVER TRUE

## 2025-07-07 SDOH — ECONOMIC STABILITY: TRANSPORTATION INSECURITY
IN THE PAST 12 MONTHS, HAS THE LACK OF TRANSPORTATION KEPT YOU FROM MEDICAL APPOINTMENTS OR FROM GETTING MEDICATIONS?: NO

## 2025-07-07 SDOH — ECONOMIC STABILITY: INCOME INSECURITY: IN THE LAST 12 MONTHS, WAS THERE A TIME WHEN YOU WERE NOT ABLE TO PAY THE MORTGAGE OR RENT ON TIME?: NO

## 2025-07-07 ASSESSMENT — PATIENT HEALTH QUESTIONNAIRE - PHQ9
SUM OF ALL RESPONSES TO PHQ QUESTIONS 1-9: 0
2. FEELING DOWN, DEPRESSED OR HOPELESS: NOT AT ALL
2. FEELING DOWN, DEPRESSED OR HOPELESS: NOT AT ALL
SUM OF ALL RESPONSES TO PHQ9 QUESTIONS 1 & 2: 0
SUM OF ALL RESPONSES TO PHQ QUESTIONS 1-9: 0
SUM OF ALL RESPONSES TO PHQ QUESTIONS 1-9: 0
1. LITTLE INTEREST OR PLEASURE IN DOING THINGS: NOT AT ALL
1. LITTLE INTEREST OR PLEASURE IN DOING THINGS: NOT AT ALL
SUM OF ALL RESPONSES TO PHQ QUESTIONS 1-9: 0

## 2025-07-09 ENCOUNTER — OFFICE VISIT (OUTPATIENT)
Dept: INTERNAL MEDICINE CLINIC | Age: 70
End: 2025-07-09

## 2025-07-09 ENCOUNTER — TELEPHONE (OUTPATIENT)
Dept: INTERNAL MEDICINE CLINIC | Age: 70
End: 2025-07-09

## 2025-07-09 VITALS
HEIGHT: 65 IN | WEIGHT: 177 LBS | BODY MASS INDEX: 29.49 KG/M2 | HEART RATE: 62 BPM | SYSTOLIC BLOOD PRESSURE: 135 MMHG | RESPIRATION RATE: 18 BRPM | DIASTOLIC BLOOD PRESSURE: 78 MMHG

## 2025-07-09 DIAGNOSIS — R60.0 PEDAL EDEMA: ICD-10-CM

## 2025-07-09 DIAGNOSIS — R00.2 HEART PALPITATIONS: Primary | ICD-10-CM

## 2025-07-09 DIAGNOSIS — E78.2 MIXED HYPERLIPIDEMIA: ICD-10-CM

## 2025-07-09 DIAGNOSIS — M19.90 ARTHRITIS: ICD-10-CM

## 2025-07-09 DIAGNOSIS — E55.9 VITAMIN D DEFICIENCY: ICD-10-CM

## 2025-07-09 PROCEDURE — 99213 OFFICE O/P EST LOW 20 MIN: CPT | Performed by: INTERNAL MEDICINE

## 2025-07-09 PROCEDURE — 1123F ACP DISCUSS/DSCN MKR DOCD: CPT | Performed by: INTERNAL MEDICINE

## 2025-07-09 PROCEDURE — 1160F RVW MEDS BY RX/DR IN RCRD: CPT | Performed by: INTERNAL MEDICINE

## 2025-07-09 PROCEDURE — 1159F MED LIST DOCD IN RCRD: CPT | Performed by: INTERNAL MEDICINE

## 2025-07-09 NOTE — TELEPHONE ENCOUNTER
----- Message from Willem PERSAUD sent at 7/9/2025  2:16 PM EDT -----  Contact: ZULEYMA [phone]  Zuleyma with Dr. Helen Agee is requesting the patient recent lab results. Please advise     Anuel MIMS MD  Obstetrician-Gynecologist  Phone: [phone]

## 2025-07-09 NOTE — PROGRESS NOTES
5 MG tablet Take 1 tablet by mouth daily      vitamin B-12 (CYANOCOBALAMIN) 100 MCG tablet Take 0.5 tablets by mouth daily       No current facility-administered medications for this visit.         Review of Systems   As above      Objective   Physical Exam  Vitals:    07/09/25 0956   BP: 135/78   Pulse: 62   Resp: 18           General: elderly female, healthy appearing   Awake, alert and oriented. Appears to be not in any distress  Mucous Membranes:  Pink , anicteric  Neck: No JVD, no carotid bruit, no thyromegaly  Chest:  Clear to auscultation bilaterally, no added sounds  Cardiovascular:  RRR S1S2 heard, no murmurs or gallops  Abdomen:  Soft, undistended, right UQ and posterior lower chest wall tenderness noted , no guarding , no organomegaly, BS present  No flank pain   Extremities: No edema or cyanosis. Distal pulses well felt  Neurological : grossly normal       Wt Readings from Last 3 Encounters:   07/09/25 80.3 kg (177 lb)   12/05/24 80.3 kg (177 lb)   10/30/24 81.2 kg (179 lb)          Diagnosis Orders   1. Heart palpitations        2. Mixed hyperlipidemia        3. Vitamin D deficiency        4. Pedal edema        5. Arthritis            Heart palpitations - intermittent sinus tach on holter  - now on BB low  Dose, toprol 37.5 mg daily helping her symptoms  Continue mag supplements  Occasional bradycardia noted on her watch, might need to cut down dose if symptomatic but no symptoms noted     Hyperlipidemia -was on lipitor but stopped last year,   LDL fairly stable at 121. HDL 49    Chronic leg cramps - on magnesium supplements  , gabapentin     On harmone supplements and  on aldactone 100 mg daily by GYN- no hypotension issues noed   Low salt diet  Avoid k supplements       Hip OA - s/p bilateral hip replacements      KIERSTEN screen done and did not need sleep machine    Had colonoscopy 2015 - due now - scheduled   Had  mammo   this year   Seen eye and dentist this year  Increase activity and lose weight

## 2025-09-05 RX ORDER — GABAPENTIN 300 MG/1
600 CAPSULE ORAL NIGHTLY
Qty: 60 CAPSULE | Refills: 0 | Status: SHIPPED | OUTPATIENT
Start: 2025-09-07 | End: 2025-10-07